# Patient Record
Sex: FEMALE | Race: WHITE | Employment: UNEMPLOYED | URBAN - METROPOLITAN AREA
[De-identification: names, ages, dates, MRNs, and addresses within clinical notes are randomized per-mention and may not be internally consistent; named-entity substitution may affect disease eponyms.]

---

## 2018-07-02 ENCOUNTER — HOSPITAL ENCOUNTER (OUTPATIENT)
Dept: WOMENS IMAGING | Age: 68
Discharge: HOME | End: 2018-07-04
Payer: MEDICARE

## 2018-07-02 DIAGNOSIS — Z12.31 VISIT FOR SCREENING MAMMOGRAM: ICD-10-CM

## 2018-07-02 DIAGNOSIS — Z01.411 ENCOUNTER FOR GYNECOLOGICAL EXAMINATION WITH ABNORMAL FINDING: ICD-10-CM

## 2018-07-02 PROCEDURE — 77063 BREAST TOMOSYNTHESIS BI: CPT

## 2019-07-17 ENCOUNTER — HOSPITAL ENCOUNTER (OUTPATIENT)
Age: 69
Setting detail: SPECIMEN
Discharge: HOME | End: 2019-07-17
Payer: MEDICARE

## 2019-07-19 LAB
HPV SAMPLE: NORMAL
HPV, GENOTYPE 16: NOT DETECTED
HPV, GENOTYPE 18: NOT DETECTED
HPV, HIGH RISK OTHER: NOT DETECTED
HPV, INTERPRETATION: NORMAL
SPECIMEN DESCRIPTION: NORMAL

## 2019-07-22 LAB — CYTOLOGY REPORT: NORMAL

## 2019-07-26 ENCOUNTER — HOSPITAL ENCOUNTER (OUTPATIENT)
Dept: WOMENS IMAGING | Age: 69
Discharge: HOME | End: 2019-07-28
Payer: MEDICARE

## 2019-07-26 ENCOUNTER — HOSPITAL ENCOUNTER (OUTPATIENT)
Age: 69
Discharge: HOME | End: 2019-07-26
Payer: MEDICARE

## 2019-07-26 DIAGNOSIS — Z12.31 VISIT FOR SCREENING MAMMOGRAM: ICD-10-CM

## 2019-07-26 LAB
ABSOLUTE EOS #: 0.2 K/UL (ref 0–0.4)
ABSOLUTE IMMATURE GRANULOCYTE: ABNORMAL K/UL (ref 0–0.3)
ABSOLUTE LYMPH #: 1.4 K/UL (ref 1–4.8)
ABSOLUTE MONO #: 0.6 K/UL (ref 0.1–1.3)
ALBUMIN SERPL-MCNC: 4.1 G/DL (ref 3.5–5.2)
ALBUMIN/GLOBULIN RATIO: ABNORMAL (ref 1–2.5)
ALP BLD-CCNC: 79 U/L (ref 35–104)
ALT SERPL-CCNC: 12 U/L (ref 5–33)
ANION GAP SERPL CALCULATED.3IONS-SCNC: 13 MMOL/L (ref 9–17)
AST SERPL-CCNC: 15 U/L
BASOPHILS # BLD: 1 % (ref 0–2)
BASOPHILS ABSOLUTE: 0.1 K/UL (ref 0–0.2)
BILIRUB SERPL-MCNC: 0.27 MG/DL (ref 0.3–1.2)
BUN BLDV-MCNC: 11 MG/DL (ref 8–23)
BUN/CREAT BLD: ABNORMAL (ref 9–20)
CALCIUM SERPL-MCNC: 9.2 MG/DL (ref 8.6–10.4)
CHLORIDE BLD-SCNC: 104 MMOL/L (ref 98–107)
CHOLESTEROL/HDL RATIO: 2.3
CHOLESTEROL: 192 MG/DL
CO2: 23 MMOL/L (ref 20–31)
CREAT SERPL-MCNC: 0.52 MG/DL (ref 0.5–0.9)
DIFFERENTIAL TYPE: ABNORMAL
EOSINOPHILS RELATIVE PERCENT: 3 % (ref 0–4)
ESTIMATED AVERAGE GLUCOSE: 137 MG/DL
GFR AFRICAN AMERICAN: >60 ML/MIN
GFR NON-AFRICAN AMERICAN: >60 ML/MIN
GFR SERPL CREATININE-BSD FRML MDRD: ABNORMAL ML/MIN/{1.73_M2}
GFR SERPL CREATININE-BSD FRML MDRD: ABNORMAL ML/MIN/{1.73_M2}
GLUCOSE BLD-MCNC: 127 MG/DL (ref 70–99)
HBA1C MFR BLD: 6.4 % (ref 4–6)
HCT VFR BLD CALC: 40.1 % (ref 36–46)
HDLC SERPL-MCNC: 83 MG/DL
HEMOGLOBIN: 12.8 G/DL (ref 12–16)
IMMATURE GRANULOCYTES: ABNORMAL %
LDL CHOLESTEROL: 99 MG/DL (ref 0–130)
LYMPHOCYTES # BLD: 22 % (ref 24–44)
MCH RBC QN AUTO: 29.6 PG (ref 26–34)
MCHC RBC AUTO-ENTMCNC: 32 G/DL (ref 31–37)
MCV RBC AUTO: 92.6 FL (ref 80–100)
MONOCYTES # BLD: 10 % (ref 1–7)
NRBC AUTOMATED: ABNORMAL PER 100 WBC
PDW BLD-RTO: 15.4 % (ref 11.5–14.9)
PLATELET # BLD: 393 K/UL (ref 150–450)
PLATELET ESTIMATE: ABNORMAL
PMV BLD AUTO: 6.8 FL (ref 6–12)
POTASSIUM SERPL-SCNC: 4.6 MMOL/L (ref 3.7–5.3)
RBC # BLD: 4.33 M/UL (ref 4–5.2)
RBC # BLD: ABNORMAL 10*6/UL
SEG NEUTROPHILS: 64 % (ref 36–66)
SEGMENTED NEUTROPHILS ABSOLUTE COUNT: 4 K/UL (ref 1.3–9.1)
SODIUM BLD-SCNC: 140 MMOL/L (ref 135–144)
THYROXINE, FREE: 1 NG/DL (ref 0.93–1.7)
TOTAL PROTEIN: 7.4 G/DL (ref 6.4–8.3)
TRIGL SERPL-MCNC: 48 MG/DL
TSH SERPL DL<=0.05 MIU/L-ACNC: 1.73 MIU/L (ref 0.3–5)
VITAMIN B-12: <150 PG/ML (ref 232–1245)
VITAMIN D 25-HYDROXY: 8 NG/ML (ref 30–100)
VLDLC SERPL CALC-MCNC: NORMAL MG/DL (ref 1–30)
WBC # BLD: 6.3 K/UL (ref 3.5–11)
WBC # BLD: ABNORMAL 10*3/UL

## 2019-07-26 PROCEDURE — 80061 LIPID PANEL: CPT

## 2019-07-26 PROCEDURE — 84439 ASSAY OF FREE THYROXINE: CPT

## 2019-07-26 PROCEDURE — 83036 HEMOGLOBIN GLYCOSYLATED A1C: CPT

## 2019-07-26 PROCEDURE — 82306 VITAMIN D 25 HYDROXY: CPT

## 2019-07-26 PROCEDURE — 36415 COLL VENOUS BLD VENIPUNCTURE: CPT

## 2019-07-26 PROCEDURE — 82607 VITAMIN B-12: CPT

## 2019-07-26 PROCEDURE — 84443 ASSAY THYROID STIM HORMONE: CPT

## 2019-07-26 PROCEDURE — 77063 BREAST TOMOSYNTHESIS BI: CPT

## 2019-07-26 PROCEDURE — 85025 COMPLETE CBC W/AUTO DIFF WBC: CPT

## 2019-07-26 PROCEDURE — 80053 COMPREHEN METABOLIC PANEL: CPT

## 2019-08-08 ENCOUNTER — HOSPITAL ENCOUNTER (OUTPATIENT)
Dept: WOMENS IMAGING | Age: 69
Discharge: HOME | End: 2019-08-10
Payer: MEDICARE

## 2019-08-08 DIAGNOSIS — Z78.0 POST-MENOPAUSAL: ICD-10-CM

## 2019-08-08 DIAGNOSIS — R92.8 ABNORMAL MAMMOGRAM: ICD-10-CM

## 2019-08-08 PROCEDURE — 77080 DXA BONE DENSITY AXIAL: CPT

## 2019-08-08 PROCEDURE — G0279 TOMOSYNTHESIS, MAMMO: HCPCS

## 2019-08-12 ENCOUNTER — TELEPHONE (OUTPATIENT)
Dept: ONCOLOGY | Age: 69
End: 2019-08-12

## 2019-08-14 ENCOUNTER — HOSPITAL ENCOUNTER (OUTPATIENT)
Dept: CT IMAGING | Age: 69
Discharge: HOME | End: 2019-08-16
Payer: MEDICARE

## 2019-08-14 DIAGNOSIS — F17.210 NICOTINE DEPENDENCE, CIGARETTES, UNCOMPLICATED: ICD-10-CM

## 2019-08-14 PROCEDURE — G0297 LDCT FOR LUNG CA SCREEN: HCPCS

## 2019-09-13 ENCOUNTER — HOSPITAL ENCOUNTER (OUTPATIENT)
Age: 69
Discharge: HOME | End: 2019-09-15
Payer: MEDICARE

## 2019-09-13 ENCOUNTER — HOSPITAL ENCOUNTER (OUTPATIENT)
Dept: GENERAL RADIOLOGY | Age: 69
Discharge: HOME | End: 2019-09-15
Payer: MEDICARE

## 2019-09-13 ENCOUNTER — HOSPITAL ENCOUNTER (OUTPATIENT)
Dept: MRI IMAGING | Age: 69
Discharge: HOME | End: 2019-09-15
Payer: MEDICARE

## 2019-09-13 DIAGNOSIS — M54.6 PAIN IN THORACIC SPINE: ICD-10-CM

## 2019-09-13 DIAGNOSIS — M54.6 THORACIC SPINE PAIN: ICD-10-CM

## 2019-09-13 PROCEDURE — 72146 MRI CHEST SPINE W/O DYE: CPT

## 2019-09-13 PROCEDURE — 72072 X-RAY EXAM THORAC SPINE 3VWS: CPT

## 2019-11-05 ENCOUNTER — OFFICE VISIT (OUTPATIENT)
Dept: ORTHOPEDIC SURGERY | Age: 69
End: 2019-11-05
Payer: MEDICARE

## 2019-11-05 VITALS — HEIGHT: 62 IN | BODY MASS INDEX: 20.61 KG/M2 | WEIGHT: 112 LBS

## 2019-11-05 DIAGNOSIS — M54.6 ACUTE MIDLINE THORACIC BACK PAIN: Primary | ICD-10-CM

## 2019-11-05 PROCEDURE — G8484 FLU IMMUNIZE NO ADMIN: HCPCS | Performed by: ORTHOPAEDIC SURGERY

## 2019-11-05 PROCEDURE — 4004F PT TOBACCO SCREEN RCVD TLK: CPT | Performed by: ORTHOPAEDIC SURGERY

## 2019-11-05 PROCEDURE — G8420 CALC BMI NORM PARAMETERS: HCPCS | Performed by: ORTHOPAEDIC SURGERY

## 2019-11-05 PROCEDURE — 1123F ACP DISCUSS/DSCN MKR DOCD: CPT | Performed by: ORTHOPAEDIC SURGERY

## 2019-11-05 PROCEDURE — 3017F COLORECTAL CA SCREEN DOC REV: CPT | Performed by: ORTHOPAEDIC SURGERY

## 2019-11-05 PROCEDURE — 1090F PRES/ABSN URINE INCON ASSESS: CPT | Performed by: ORTHOPAEDIC SURGERY

## 2019-11-05 PROCEDURE — G8427 DOCREV CUR MEDS BY ELIG CLIN: HCPCS | Performed by: ORTHOPAEDIC SURGERY

## 2019-11-05 PROCEDURE — 4040F PNEUMOC VAC/ADMIN/RCVD: CPT | Performed by: ORTHOPAEDIC SURGERY

## 2019-11-05 PROCEDURE — 99203 OFFICE O/P NEW LOW 30 MIN: CPT | Performed by: ORTHOPAEDIC SURGERY

## 2019-11-05 PROCEDURE — G8399 PT W/DXA RESULTS DOCUMENT: HCPCS | Performed by: ORTHOPAEDIC SURGERY

## 2019-11-05 RX ORDER — NAPROXEN 500 MG/1
500 TABLET ORAL 2 TIMES DAILY WITH MEALS
Qty: 60 TABLET | Refills: 0 | Status: SHIPPED | OUTPATIENT
Start: 2019-11-05 | End: 2019-12-03 | Stop reason: SDUPTHER

## 2019-11-05 ASSESSMENT — ENCOUNTER SYMPTOMS: BACK PAIN: 1

## 2019-11-20 ENCOUNTER — HOSPITAL ENCOUNTER (OUTPATIENT)
Dept: PHYSICAL THERAPY | Age: 69
Setting detail: THERAPIES SERIES
Discharge: HOME | End: 2019-11-20
Payer: MEDICARE

## 2019-11-20 PROCEDURE — 97110 THERAPEUTIC EXERCISES: CPT

## 2019-11-20 PROCEDURE — 97016 VASOPNEUMATIC DEVICE THERAPY: CPT

## 2019-11-20 PROCEDURE — 97162 PT EVAL MOD COMPLEX 30 MIN: CPT

## 2019-11-20 ASSESSMENT — PAIN DESCRIPTION - LOCATION: LOCATION: BACK

## 2019-11-20 ASSESSMENT — PAIN SCALES - GENERAL: PAINLEVEL_OUTOF10: 6

## 2019-11-20 ASSESSMENT — PAIN DESCRIPTION - PROGRESSION: CLINICAL_PROGRESSION: GRADUALLY WORSENING

## 2019-11-20 ASSESSMENT — PAIN DESCRIPTION - ORIENTATION: ORIENTATION: LOWER;MID

## 2019-11-20 ASSESSMENT — PAIN DESCRIPTION - ONSET: ONSET: ON-GOING

## 2019-11-20 ASSESSMENT — PAIN DESCRIPTION - DIRECTION: RADIATING_TOWARDS: LOWER BACK

## 2019-11-20 ASSESSMENT — PAIN DESCRIPTION - DESCRIPTORS: DESCRIPTORS: SHARP;PRESSURE

## 2019-11-20 ASSESSMENT — PAIN - FUNCTIONAL ASSESSMENT: PAIN_FUNCTIONAL_ASSESSMENT: PREVENTS OR INTERFERES SOME ACTIVE ACTIVITIES AND ADLS

## 2019-11-20 ASSESSMENT — PAIN DESCRIPTION - PAIN TYPE: TYPE: CHRONIC PAIN

## 2019-11-20 ASSESSMENT — PAIN DESCRIPTION - FREQUENCY: FREQUENCY: CONTINUOUS

## 2019-11-21 ASSESSMENT — PAIN SCALES - GENERAL: PAINLEVEL_OUTOF10: 6

## 2019-11-21 ASSESSMENT — PAIN DESCRIPTION - LOCATION: LOCATION: BACK

## 2019-11-21 ASSESSMENT — PAIN DESCRIPTION - ONSET: ONSET: ON-GOING

## 2019-11-21 ASSESSMENT — PAIN DESCRIPTION - PAIN TYPE: TYPE: CHRONIC PAIN

## 2019-11-21 ASSESSMENT — PAIN DESCRIPTION - FREQUENCY: FREQUENCY: CONTINUOUS

## 2019-11-21 ASSESSMENT — PAIN DESCRIPTION - ORIENTATION: ORIENTATION: LOWER;MID

## 2019-11-21 ASSESSMENT — PAIN DESCRIPTION - DIRECTION: RADIATING_TOWARDS: LOWER BACK

## 2019-11-21 ASSESSMENT — PAIN DESCRIPTION - DESCRIPTORS: DESCRIPTORS: SHARP

## 2019-11-21 ASSESSMENT — PAIN - FUNCTIONAL ASSESSMENT: PAIN_FUNCTIONAL_ASSESSMENT: PREVENTS OR INTERFERES SOME ACTIVE ACTIVITIES AND ADLS

## 2019-11-21 ASSESSMENT — PAIN DESCRIPTION - PROGRESSION: CLINICAL_PROGRESSION: GRADUALLY WORSENING

## 2019-11-25 ENCOUNTER — HOSPITAL ENCOUNTER (OUTPATIENT)
Dept: PHYSICAL THERAPY | Age: 69
Setting detail: THERAPIES SERIES
Discharge: HOME | End: 2019-11-25
Payer: MEDICARE

## 2019-11-25 PROCEDURE — 97110 THERAPEUTIC EXERCISES: CPT

## 2019-11-25 ASSESSMENT — PAIN DESCRIPTION - LOCATION: LOCATION: BACK

## 2019-11-25 ASSESSMENT — PAIN DESCRIPTION - PAIN TYPE: TYPE: CHRONIC PAIN

## 2019-11-25 ASSESSMENT — PAIN DESCRIPTION - DIRECTION: RADIATING_TOWARDS: LOW BACK

## 2019-11-25 ASSESSMENT — PAIN SCALES - GENERAL: PAINLEVEL_OUTOF10: 6

## 2019-11-25 ASSESSMENT — PAIN DESCRIPTION - ORIENTATION: ORIENTATION: LOWER;MID

## 2019-11-25 ASSESSMENT — PAIN DESCRIPTION - DESCRIPTORS: DESCRIPTORS: SHARP

## 2019-11-27 ENCOUNTER — HOSPITAL ENCOUNTER (OUTPATIENT)
Dept: PHYSICAL THERAPY | Age: 69
Setting detail: THERAPIES SERIES
Discharge: HOME | End: 2019-11-27
Payer: MEDICARE

## 2019-11-27 PROCEDURE — 97110 THERAPEUTIC EXERCISES: CPT

## 2019-11-27 ASSESSMENT — PAIN DESCRIPTION - ORIENTATION: ORIENTATION: POSTERIOR;MID;LOWER

## 2019-11-27 ASSESSMENT — PAIN DESCRIPTION - DESCRIPTORS: DESCRIPTORS: SHARP

## 2019-11-27 ASSESSMENT — PAIN DESCRIPTION - DIRECTION: RADIATING_TOWARDS: LOW BACK

## 2019-11-27 ASSESSMENT — PAIN SCALES - GENERAL: PAINLEVEL_OUTOF10: 6

## 2019-11-27 ASSESSMENT — PAIN DESCRIPTION - LOCATION: LOCATION: NECK;BACK

## 2019-11-27 ASSESSMENT — PAIN DESCRIPTION - PAIN TYPE: TYPE: CHRONIC PAIN

## 2019-12-02 ENCOUNTER — HOSPITAL ENCOUNTER (OUTPATIENT)
Dept: PHYSICAL THERAPY | Age: 69
Setting detail: THERAPIES SERIES
Discharge: HOME | End: 2019-12-02
Payer: MEDICARE

## 2019-12-02 PROCEDURE — 97110 THERAPEUTIC EXERCISES: CPT

## 2019-12-02 ASSESSMENT — PAIN SCALES - GENERAL: PAINLEVEL_OUTOF10: 2

## 2019-12-02 ASSESSMENT — PAIN - FUNCTIONAL ASSESSMENT: PAIN_FUNCTIONAL_ASSESSMENT: PREVENTS OR INTERFERES SOME ACTIVE ACTIVITIES AND ADLS

## 2019-12-02 ASSESSMENT — PAIN DESCRIPTION - LOCATION: LOCATION: NECK;BACK

## 2019-12-02 ASSESSMENT — PAIN DESCRIPTION - PROGRESSION: CLINICAL_PROGRESSION: GRADUALLY IMPROVING

## 2019-12-02 ASSESSMENT — PAIN DESCRIPTION - FREQUENCY: FREQUENCY: CONTINUOUS

## 2019-12-02 ASSESSMENT — PAIN DESCRIPTION - ORIENTATION: ORIENTATION: POSTERIOR;MID;LOWER

## 2019-12-02 ASSESSMENT — PAIN DESCRIPTION - PAIN TYPE: TYPE: CHRONIC PAIN

## 2019-12-02 ASSESSMENT — PAIN DESCRIPTION - ONSET: ONSET: UNABLE TO TELL

## 2019-12-02 ASSESSMENT — PAIN DESCRIPTION - DESCRIPTORS: DESCRIPTORS: SHARP;DULL

## 2019-12-02 ASSESSMENT — PAIN DESCRIPTION - DIRECTION: RADIATING_TOWARDS: ACROSS LOWER BACK

## 2019-12-03 DIAGNOSIS — M54.6 ACUTE MIDLINE THORACIC BACK PAIN: ICD-10-CM

## 2019-12-04 ENCOUNTER — HOSPITAL ENCOUNTER (OUTPATIENT)
Dept: PHYSICAL THERAPY | Age: 69
Setting detail: THERAPIES SERIES
Discharge: HOME | End: 2019-12-04
Payer: MEDICARE

## 2019-12-04 PROCEDURE — 97110 THERAPEUTIC EXERCISES: CPT

## 2019-12-04 ASSESSMENT — PAIN DESCRIPTION - PROGRESSION: CLINICAL_PROGRESSION: GRADUALLY IMPROVING

## 2019-12-04 ASSESSMENT — PAIN SCALES - GENERAL: PAINLEVEL_OUTOF10: 2

## 2019-12-04 ASSESSMENT — PAIN DESCRIPTION - DESCRIPTORS: DESCRIPTORS: DULL;SHARP

## 2019-12-04 ASSESSMENT — PAIN DESCRIPTION - ORIENTATION: ORIENTATION: POSTERIOR;MID

## 2019-12-04 ASSESSMENT — PAIN DESCRIPTION - LOCATION: LOCATION: NECK;BACK

## 2019-12-04 ASSESSMENT — PAIN DESCRIPTION - FREQUENCY: FREQUENCY: CONTINUOUS

## 2019-12-04 ASSESSMENT — PAIN - FUNCTIONAL ASSESSMENT: PAIN_FUNCTIONAL_ASSESSMENT: PREVENTS OR INTERFERES SOME ACTIVE ACTIVITIES AND ADLS

## 2019-12-04 ASSESSMENT — PAIN DESCRIPTION - ONSET: ONSET: UNABLE TO TELL

## 2019-12-04 ASSESSMENT — PAIN DESCRIPTION - PAIN TYPE: TYPE: CHRONIC PAIN

## 2019-12-07 RX ORDER — NAPROXEN 500 MG/1
TABLET ORAL
Qty: 60 TABLET | Refills: 0 | Status: SHIPPED | OUTPATIENT
Start: 2019-12-07 | End: 2020-01-09

## 2019-12-09 ENCOUNTER — HOSPITAL ENCOUNTER (OUTPATIENT)
Dept: PHYSICAL THERAPY | Age: 69
Setting detail: THERAPIES SERIES
Discharge: HOME | End: 2019-12-09
Payer: MEDICARE

## 2019-12-09 PROCEDURE — 97110 THERAPEUTIC EXERCISES: CPT

## 2019-12-09 ASSESSMENT — PAIN DESCRIPTION - PROGRESSION: CLINICAL_PROGRESSION: GRADUALLY IMPROVING

## 2019-12-10 ASSESSMENT — PAIN DESCRIPTION - FREQUENCY: FREQUENCY: CONTINUOUS

## 2019-12-10 ASSESSMENT — PAIN SCALES - GENERAL: PAINLEVEL_OUTOF10: 1

## 2019-12-10 ASSESSMENT — PAIN DESCRIPTION - LOCATION: LOCATION: NECK;BACK

## 2019-12-10 ASSESSMENT — PAIN DESCRIPTION - PAIN TYPE: TYPE: CHRONIC PAIN

## 2019-12-10 ASSESSMENT — PAIN DESCRIPTION - ORIENTATION: ORIENTATION: POSTERIOR;UPPER

## 2019-12-10 ASSESSMENT — PAIN DESCRIPTION - PROGRESSION: CLINICAL_PROGRESSION: GRADUALLY IMPROVING

## 2019-12-10 ASSESSMENT — PAIN - FUNCTIONAL ASSESSMENT: PAIN_FUNCTIONAL_ASSESSMENT: ACTIVITIES ARE NOT PREVENTED

## 2019-12-10 ASSESSMENT — PAIN DESCRIPTION - DESCRIPTORS: DESCRIPTORS: DULL;DISCOMFORT

## 2019-12-10 ASSESSMENT — PAIN DESCRIPTION - ONSET: ONSET: GRADUAL

## 2019-12-11 ENCOUNTER — HOSPITAL ENCOUNTER (OUTPATIENT)
Dept: PHYSICAL THERAPY | Age: 69
Setting detail: THERAPIES SERIES
Discharge: HOME | End: 2019-12-11
Payer: MEDICARE

## 2019-12-11 PROCEDURE — 97140 MANUAL THERAPY 1/> REGIONS: CPT

## 2019-12-11 PROCEDURE — 97110 THERAPEUTIC EXERCISES: CPT

## 2019-12-11 ASSESSMENT — PAIN DESCRIPTION - ONSET: ONSET: ON-GOING

## 2019-12-11 ASSESSMENT — PAIN DESCRIPTION - PAIN TYPE: TYPE: CHRONIC PAIN

## 2019-12-11 ASSESSMENT — PAIN - FUNCTIONAL ASSESSMENT: PAIN_FUNCTIONAL_ASSESSMENT: PREVENTS OR INTERFERES SOME ACTIVE ACTIVITIES AND ADLS

## 2019-12-11 ASSESSMENT — PAIN DESCRIPTION - PROGRESSION: CLINICAL_PROGRESSION: GRADUALLY IMPROVING

## 2019-12-11 ASSESSMENT — PAIN SCALES - GENERAL: PAINLEVEL_OUTOF10: 3

## 2019-12-11 ASSESSMENT — PAIN DESCRIPTION - DESCRIPTORS: DESCRIPTORS: ACHING;CONSTANT

## 2019-12-11 ASSESSMENT — PAIN DESCRIPTION - FREQUENCY: FREQUENCY: CONTINUOUS

## 2019-12-11 ASSESSMENT — PAIN DESCRIPTION - LOCATION: LOCATION: BACK;NECK

## 2019-12-19 ENCOUNTER — HOSPITAL ENCOUNTER (OUTPATIENT)
Dept: PHYSICAL THERAPY | Age: 69
Setting detail: THERAPIES SERIES
Discharge: HOME | End: 2019-12-19
Payer: MEDICARE

## 2019-12-19 PROCEDURE — 97112 NEUROMUSCULAR REEDUCATION: CPT

## 2019-12-19 PROCEDURE — 97110 THERAPEUTIC EXERCISES: CPT

## 2019-12-19 ASSESSMENT — PAIN DESCRIPTION - PROGRESSION: CLINICAL_PROGRESSION: GRADUALLY IMPROVING

## 2019-12-23 ENCOUNTER — HOSPITAL ENCOUNTER (OUTPATIENT)
Dept: PHYSICAL THERAPY | Age: 69
Setting detail: THERAPIES SERIES
Discharge: HOME | End: 2019-12-23
Payer: MEDICARE

## 2019-12-23 PROCEDURE — 97110 THERAPEUTIC EXERCISES: CPT

## 2019-12-23 PROCEDURE — 97112 NEUROMUSCULAR REEDUCATION: CPT

## 2019-12-23 ASSESSMENT — PAIN DESCRIPTION - DESCRIPTORS: DESCRIPTORS: ACHING;DISCOMFORT

## 2019-12-23 ASSESSMENT — PAIN DESCRIPTION - PROGRESSION: CLINICAL_PROGRESSION: GRADUALLY IMPROVING

## 2019-12-23 ASSESSMENT — PAIN DESCRIPTION - FREQUENCY: FREQUENCY: CONTINUOUS

## 2019-12-23 ASSESSMENT — PAIN SCALES - GENERAL: PAINLEVEL_OUTOF10: 3

## 2019-12-23 ASSESSMENT — PAIN DESCRIPTION - ONSET: ONSET: ON-GOING

## 2019-12-23 ASSESSMENT — PAIN DESCRIPTION - LOCATION: LOCATION: BACK;NECK

## 2019-12-23 ASSESSMENT — PAIN - FUNCTIONAL ASSESSMENT: PAIN_FUNCTIONAL_ASSESSMENT: PREVENTS OR INTERFERES SOME ACTIVE ACTIVITIES AND ADLS

## 2019-12-23 ASSESSMENT — PAIN DESCRIPTION - PAIN TYPE: TYPE: CHRONIC PAIN

## 2019-12-26 ENCOUNTER — HOSPITAL ENCOUNTER (OUTPATIENT)
Dept: PHYSICAL THERAPY | Age: 69
Setting detail: THERAPIES SERIES
Discharge: HOME | End: 2019-12-26
Payer: MEDICARE

## 2019-12-26 PROCEDURE — 97110 THERAPEUTIC EXERCISES: CPT

## 2019-12-26 PROCEDURE — 97112 NEUROMUSCULAR REEDUCATION: CPT

## 2019-12-26 ASSESSMENT — PAIN DESCRIPTION - FREQUENCY: FREQUENCY: CONTINUOUS

## 2019-12-26 ASSESSMENT — PAIN DESCRIPTION - ONSET: ONSET: ON-GOING

## 2019-12-26 ASSESSMENT — PAIN DESCRIPTION - PAIN TYPE: TYPE: CHRONIC PAIN

## 2019-12-26 ASSESSMENT — PAIN DESCRIPTION - LOCATION: LOCATION: BACK;NECK

## 2019-12-26 ASSESSMENT — PAIN DESCRIPTION - PROGRESSION: CLINICAL_PROGRESSION: GRADUALLY IMPROVING

## 2019-12-26 ASSESSMENT — PAIN DESCRIPTION - ORIENTATION: ORIENTATION: MID;PROXIMAL

## 2019-12-26 ASSESSMENT — PAIN SCALES - GENERAL: PAINLEVEL_OUTOF10: 3

## 2019-12-26 ASSESSMENT — PAIN - FUNCTIONAL ASSESSMENT: PAIN_FUNCTIONAL_ASSESSMENT: PREVENTS OR INTERFERES SOME ACTIVE ACTIVITIES AND ADLS

## 2019-12-26 ASSESSMENT — PAIN DESCRIPTION - DESCRIPTORS: DESCRIPTORS: ACHING

## 2019-12-30 ENCOUNTER — HOSPITAL ENCOUNTER (OUTPATIENT)
Dept: PHYSICAL THERAPY | Age: 69
Setting detail: THERAPIES SERIES
Discharge: HOME | End: 2019-12-30
Payer: MEDICARE

## 2019-12-30 PROCEDURE — 97110 THERAPEUTIC EXERCISES: CPT

## 2019-12-30 PROCEDURE — 97112 NEUROMUSCULAR REEDUCATION: CPT

## 2019-12-30 ASSESSMENT — PAIN DESCRIPTION - ONSET: ONSET: ON-GOING

## 2019-12-30 ASSESSMENT — PAIN DESCRIPTION - LOCATION: LOCATION: BACK;NECK

## 2019-12-30 ASSESSMENT — PAIN DESCRIPTION - PROGRESSION: CLINICAL_PROGRESSION: GRADUALLY IMPROVING

## 2019-12-30 ASSESSMENT — PAIN SCALES - GENERAL: PAINLEVEL_OUTOF10: 2

## 2019-12-30 ASSESSMENT — PAIN DESCRIPTION - DESCRIPTORS: DESCRIPTORS: ACHING

## 2019-12-30 ASSESSMENT — PAIN - FUNCTIONAL ASSESSMENT: PAIN_FUNCTIONAL_ASSESSMENT: PREVENTS OR INTERFERES SOME ACTIVE ACTIVITIES AND ADLS

## 2019-12-30 ASSESSMENT — PAIN DESCRIPTION - FREQUENCY: FREQUENCY: CONTINUOUS

## 2019-12-30 ASSESSMENT — PAIN DESCRIPTION - ORIENTATION: ORIENTATION: MID;PROXIMAL

## 2019-12-30 ASSESSMENT — PAIN DESCRIPTION - PAIN TYPE: TYPE: CHRONIC PAIN

## 2020-01-02 ENCOUNTER — HOSPITAL ENCOUNTER (OUTPATIENT)
Dept: PHYSICAL THERAPY | Age: 70
Setting detail: THERAPIES SERIES
Discharge: HOME | End: 2020-01-02
Payer: MEDICARE

## 2020-01-02 ENCOUNTER — OFFICE VISIT (OUTPATIENT)
Dept: ORTHOPEDIC SURGERY | Age: 70
End: 2020-01-02
Payer: MEDICARE

## 2020-01-02 PROCEDURE — G8427 DOCREV CUR MEDS BY ELIG CLIN: HCPCS | Performed by: ORTHOPAEDIC SURGERY

## 2020-01-02 PROCEDURE — 4004F PT TOBACCO SCREEN RCVD TLK: CPT | Performed by: ORTHOPAEDIC SURGERY

## 2020-01-02 PROCEDURE — 97112 NEUROMUSCULAR REEDUCATION: CPT

## 2020-01-02 PROCEDURE — 1090F PRES/ABSN URINE INCON ASSESS: CPT | Performed by: ORTHOPAEDIC SURGERY

## 2020-01-02 PROCEDURE — 99213 OFFICE O/P EST LOW 20 MIN: CPT | Performed by: ORTHOPAEDIC SURGERY

## 2020-01-02 PROCEDURE — 3017F COLORECTAL CA SCREEN DOC REV: CPT | Performed by: ORTHOPAEDIC SURGERY

## 2020-01-02 PROCEDURE — 4040F PNEUMOC VAC/ADMIN/RCVD: CPT | Performed by: ORTHOPAEDIC SURGERY

## 2020-01-02 PROCEDURE — 1123F ACP DISCUSS/DSCN MKR DOCD: CPT | Performed by: ORTHOPAEDIC SURGERY

## 2020-01-02 PROCEDURE — G8420 CALC BMI NORM PARAMETERS: HCPCS | Performed by: ORTHOPAEDIC SURGERY

## 2020-01-02 PROCEDURE — G8484 FLU IMMUNIZE NO ADMIN: HCPCS | Performed by: ORTHOPAEDIC SURGERY

## 2020-01-02 PROCEDURE — G8399 PT W/DXA RESULTS DOCUMENT: HCPCS | Performed by: ORTHOPAEDIC SURGERY

## 2020-01-02 PROCEDURE — 97110 THERAPEUTIC EXERCISES: CPT

## 2020-01-02 ASSESSMENT — PAIN DESCRIPTION - PROGRESSION: CLINICAL_PROGRESSION: GRADUALLY IMPROVING

## 2020-01-02 ASSESSMENT — PAIN DESCRIPTION - FREQUENCY: FREQUENCY: CONTINUOUS

## 2020-01-02 ASSESSMENT — PAIN DESCRIPTION - DESCRIPTORS: DESCRIPTORS: ACHING

## 2020-01-02 ASSESSMENT — PAIN SCALES - GENERAL: PAINLEVEL_OUTOF10: 6

## 2020-01-02 ASSESSMENT — PAIN DESCRIPTION - PAIN TYPE: TYPE: CHRONIC PAIN

## 2020-01-02 ASSESSMENT — PAIN DESCRIPTION - LOCATION: LOCATION: BACK;NECK

## 2020-01-02 ASSESSMENT — PAIN DESCRIPTION - ONSET: ONSET: ON-GOING

## 2020-01-02 ASSESSMENT — PAIN - FUNCTIONAL ASSESSMENT: PAIN_FUNCTIONAL_ASSESSMENT: PREVENTS OR INTERFERES SOME ACTIVE ACTIVITIES AND ADLS

## 2020-01-02 ASSESSMENT — PAIN DESCRIPTION - ORIENTATION: ORIENTATION: MID;PROXIMAL

## 2020-01-02 NOTE — PROGRESS NOTES
resource strain: Not on file    Food insecurity:     Worry: Not on file     Inability: Not on file    Transportation needs:     Medical: Not on file     Non-medical: Not on file   Tobacco Use    Smoking status: Current Every Day Smoker     Packs/day: 1.00     Years: 40.00     Pack years: 40.00    Tobacco comment: H/O 4 packs per day, down to 1 pack per day as of 3/11/2015   Substance and Sexual Activity    Alcohol use: Not on file    Drug use: No    Sexual activity: Not on file   Lifestyle    Physical activity:     Days per week: Not on file     Minutes per session: Not on file    Stress: Not on file   Relationships    Social connections:     Talks on phone: Not on file     Gets together: Not on file     Attends Muslim service: Not on file     Active member of club or organization: Not on file     Attends meetings of clubs or organizations: Not on file     Relationship status: Not on file    Intimate partner violence:     Fear of current or ex partner: Not on file     Emotionally abused: Not on file     Physically abused: Not on file     Forced sexual activity: Not on file   Other Topics Concern    Not on file   Social History Narrative    Not on file     Past Medical History:   Diagnosis Date    Breast mass, right     H/O abnormal Pap smear     had LEEP    Lesion of skin of face     forehead    MVP (mitral valve prolapse)     Psoriasis      Past Surgical History:   Procedure Laterality Date    BREAST SURGERY Right 3-25-15    BIOPSY W/ NEEDLE LOCALIZATION.  LEEP      SKIN BIOPSY Right 3-25-15    FOREHEAD SKIN LESION    TONSILLECTOMY       No family history on file. Labs and Imaging:     XR taken today:  Xr Lumbar Spine (2-3 Views)    Result Date: 1/2/2020  AP and lateral lumbar spine some athero-sclerotic disease lumbar spine less degenerative changes than expected for patient of her age no acute fractures      Assessment and Plan:  1.  Acute low back pain, unspecified back pain laterality, unspecified whether sciatica present        This is a 77 y.o. female who presents to the clinic today for follow up of thoracic back pain. Her thoracic pain has improved, she is now complaining of pain more in her lower back. She did well with therapy on her thoracic spine and recommend that she complete therapy for her low back as her imaging is relatively unremarkable. Advised for her to follow up in 4 weeks. Scribe Attestation:  By signing my name below, Rolf Betancourt, attest that this documentation has been prepared under the direction and in the presence of Dr. Roya Tang. Electronically signed: Margo Hill, 1/2/20     Please note that this chart was generated using voice recognition Dragon dictation software. Although every effort was made to ensure the accuracy of this automated transcription, some errors in transcription may have occurred.

## 2020-01-09 RX ORDER — NAPROXEN 500 MG/1
TABLET ORAL
Qty: 60 TABLET | Refills: 0 | Status: SHIPPED | OUTPATIENT
Start: 2020-01-09 | End: 2020-02-06

## 2020-01-13 ENCOUNTER — HOSPITAL ENCOUNTER (OUTPATIENT)
Dept: PHYSICAL THERAPY | Age: 70
Setting detail: THERAPIES SERIES
Discharge: HOME | End: 2020-01-13
Payer: MEDICARE

## 2020-01-13 ENCOUNTER — APPOINTMENT (OUTPATIENT)
Dept: PHYSICAL THERAPY | Age: 70
End: 2020-01-13
Payer: MEDICARE

## 2020-01-13 PROCEDURE — 97112 NEUROMUSCULAR REEDUCATION: CPT

## 2020-01-13 PROCEDURE — 97110 THERAPEUTIC EXERCISES: CPT

## 2020-01-13 ASSESSMENT — PAIN DESCRIPTION - DESCRIPTORS
DESCRIPTORS: ACHING
DESCRIPTORS: ACHING

## 2020-01-13 ASSESSMENT — PAIN DESCRIPTION - FREQUENCY
FREQUENCY: CONTINUOUS
FREQUENCY: CONTINUOUS

## 2020-01-13 ASSESSMENT — PAIN - FUNCTIONAL ASSESSMENT: PAIN_FUNCTIONAL_ASSESSMENT: PREVENTS OR INTERFERES SOME ACTIVE ACTIVITIES AND ADLS

## 2020-01-13 ASSESSMENT — PAIN DESCRIPTION - LOCATION
LOCATION: BACK;NECK
LOCATION: BACK;NECK

## 2020-01-13 ASSESSMENT — PAIN SCALES - GENERAL
PAINLEVEL_OUTOF10: 6
PAINLEVEL_OUTOF10: 6

## 2020-01-13 ASSESSMENT — PAIN DESCRIPTION - ORIENTATION
ORIENTATION: MID;PROXIMAL
ORIENTATION: MID;PROXIMAL

## 2020-01-13 ASSESSMENT — PAIN DESCRIPTION - PROGRESSION
CLINICAL_PROGRESSION: GRADUALLY IMPROVING
CLINICAL_PROGRESSION: GRADUALLY IMPROVING

## 2020-01-13 ASSESSMENT — PAIN DESCRIPTION - ONSET
ONSET: ON-GOING
ONSET: ON-GOING

## 2020-01-13 ASSESSMENT — PAIN DESCRIPTION - PAIN TYPE
TYPE: CHRONIC PAIN
TYPE: CHRONIC PAIN

## 2020-01-14 NOTE — PROGRESS NOTES
cane  Assistance: Independent  Comment: Limited ability to assent and descend steps due to mid back pain      Balance  Posture: Poor  Sitting - Static: Good  Sitting - Dynamic: Good  Standing - Static: Good  Standing - Dynamic: Fair  Single Leg Stance R Le  Single Leg Stance L Le  AROM RUE (degrees)  RUE AROM : WFL  AROM LUE (degrees)  LUE AROM : WFL  Spine  Cervical: 50 % limited AROM in all directions   Thoracic: painful and limited extension   Lumbar: Not tested   Strength RUE  Strength RUE: WFL  Comment: mildly painful mid back   Strength LUE  Strength LUE: WFL  Comment: mildly painful mid back     Exercises  Exercise 1: HEP - postural changes -in Sitting with \"V\" pillow to lesson kyphosis and position head to neutral posture   Exercise 2: HEP - Sitting (B) UE - ER sitting in chair with \"V\" pillow 15 x   Exercise 3: HEP Avoid poor head posture - down and rotated right. Exercise 4: HEP Neck extension with towel 2 reps 25%  Exercise 5: sitting chin tucks 15 x 2   Exercise 6: sitting neck extensions to 25 % of normal with towel 30' x 2   Exercise 7: Rotations R/L with overpressure 30 '' x 2   Exercise 8: Side bending R/L 30 '' x 2 each side with over presser  Exercise 9: Sitting chin tuck 15 x 1   Exercise 10: Doorway strech 6 x 30 sec hold   Exercise 11: LEONORA   Exercise 12: prone laying   Exercise 13: LEONORA 2 min   Exercise 14: Prone press ups 15x      Modalities  Moist heat: 10 min cervical spine sitting in chair with pillow behind her back. Ortho Screen  Posture: poor       Assessment:   Conditions Requiring Skilled Therapeutic Intervention  Body structures, Functions, Activity limitations: Decreased functional mobility ; Decreased ADL status; Decreased ROM; Decreased strength;Decreased posture; Increased pain;Decreased balance;Decreased high-level IADLs  Assessment: New orders or mid and lower back pain - Dr Samantha Hernández - continue 2 x 6 weeks   Treatment Diagnosis: Mid back pain   Prognosis: Good  Barriers to Learning: Pain   REQUIRES PT FOLLOW UP: Yes  Treatment Initiated : HEP   Discharge Recommendations: Home independently  Activity Tolerance  Activity Tolerance: Patient Tolerated treatment well;Patient limited by pain      Goals:  Short term goals  Time Frame for Short term goals: 2 weeks   Short term goal 1: OPTIMAL score of 12/3 at the time of her evaluation, her goal is 9/3. (MET )  Long term goals  Time Frame for Long term goals : 6 weeks   Long term goal 1: OPTIMAL score of 6/3 at the time of her discharge. (NOT MET )  Long term goal 2:  Independent with her home program. (MET)  Patient Goals   Patient goals : Walk and move around without pain (MET)    Plan:     Continue  Timed Code Treatment Minutes: 45 Minutes  Total Treatment Time: 45  Frequency and duration of TX  Days: 2  Weeks: 6     Therapy Time   Individual Concurrent Group Co-treatment   Time In  1500         Time Out  1600         Minutes  60 min Total  15 min   N/C HP  30 min   XRD  15 min   NRD         Timed Code Treatment Minutes: 70 Meaghan Bledsoe, PT

## 2020-01-15 ENCOUNTER — HOSPITAL ENCOUNTER (OUTPATIENT)
Dept: PHYSICAL THERAPY | Age: 70
Setting detail: THERAPIES SERIES
Discharge: HOME | End: 2020-01-15
Payer: MEDICARE

## 2020-01-20 ENCOUNTER — HOSPITAL ENCOUNTER (OUTPATIENT)
Dept: PHYSICAL THERAPY | Age: 70
Setting detail: THERAPIES SERIES
Discharge: HOME | End: 2020-01-20
Payer: MEDICARE

## 2020-01-20 PROCEDURE — 97110 THERAPEUTIC EXERCISES: CPT

## 2020-01-20 ASSESSMENT — PAIN DESCRIPTION - ONSET: ONSET: ON-GOING

## 2020-01-20 ASSESSMENT — PAIN SCALES - GENERAL: PAINLEVEL_OUTOF10: 2

## 2020-01-20 ASSESSMENT — PAIN DESCRIPTION - PAIN TYPE: TYPE: CHRONIC PAIN

## 2020-01-20 ASSESSMENT — PAIN DESCRIPTION - PROGRESSION: CLINICAL_PROGRESSION: GRADUALLY IMPROVING

## 2020-01-20 ASSESSMENT — PAIN DESCRIPTION - DIRECTION: RADIATING_TOWARDS: ACROSS THE LOWER BACK

## 2020-01-20 ASSESSMENT — PAIN DESCRIPTION - FREQUENCY: FREQUENCY: CONTINUOUS

## 2020-01-20 ASSESSMENT — PAIN DESCRIPTION - ORIENTATION: ORIENTATION: MID;PROXIMAL

## 2020-01-20 ASSESSMENT — PAIN - FUNCTIONAL ASSESSMENT: PAIN_FUNCTIONAL_ASSESSMENT: PREVENTS OR INTERFERES SOME ACTIVE ACTIVITIES AND ADLS

## 2020-01-20 ASSESSMENT — PAIN DESCRIPTION - DESCRIPTORS: DESCRIPTORS: ACHING

## 2020-01-20 ASSESSMENT — PAIN DESCRIPTION - LOCATION: LOCATION: BACK;NECK

## 2020-01-20 NOTE — PROGRESS NOTES
Physical Therapy  Daily Treatment Note  Date: 2020  Patient Name: Simone Smiley  MRN: [id number]     :   1950    Subjective:   General  Referring Practitioner: Stella Foss   PT Visit Information  Onset Date: 11/15/18  PT Insurance Information: [place] [employer]  Total # of Visits Approved: 24  Total # of Visits to Date: 14  Plan of Care/Certification Expiration Date: 20  No Show: 0  Canceled Appointment: 1  Progress Note Counter:   Subjective  Subjective: I feel pretty good, with less pain and I feel looser. Pain Screening  Patient Currently in Pain: Yes  Pain Assessment  Pain Assessment: 0-10  Pain Level: 2  Patient's Stated Pain Goal: No pain  Pain Type: Chronic pain  Pain Location: Back;Neck  Pain Orientation: Mid;Proximal  Pain Radiating Towards: Across the lower back   Pain Descriptors: Aching  Pain Frequency: Continuous  Pain Onset: On-going  Clinical Progression: Gradually improving  Functional Pain Assessment: Prevents or interferes some active activities and ADLs  Non-Pharmaceutical Pain Intervention(s): Ambulation/Increased Activity; Emotional support; Environmental changes; Heat applied;Repositioned;Relaxation techniques; Rest  Response to Pain Intervention: None  Multiple Pain Sites: Yes  Vital Signs  Patient Currently in Pain: Yes  Patient Observation  Observations: Limited mobility due to mid back pain. Treatment Activities:      Bed mobility  Bridging: Independent  Transfers  Sit to Stand: Independent        Ambulation  Ambulation?: Yes  Ambulation 1  Surface: uneven;carpet;outdoors;level tile  Device: Single point cane; No Device  Assistance: Independent  Quality of Gait: Fair   Gait Deviations: Slow Parul;Deviated path  Distance: 250 ft   Comments: limited ability to walk due to mid back pain   Stairs/Curb  Stairs?: Yes  Stairs  # Steps : 5  Stairs Height: 6\"  Rails: Bilateral  Device: No Device; Single pt cane  Assistance: Independent  Comment: Limited ability to assent and

## 2020-01-22 ENCOUNTER — HOSPITAL ENCOUNTER (OUTPATIENT)
Dept: PHYSICAL THERAPY | Age: 70
Setting detail: THERAPIES SERIES
Discharge: HOME | End: 2020-01-22
Payer: MEDICARE

## 2020-01-22 PROCEDURE — 97110 THERAPEUTIC EXERCISES: CPT

## 2020-01-22 ASSESSMENT — PAIN DESCRIPTION - PROGRESSION: CLINICAL_PROGRESSION: GRADUALLY IMPROVING

## 2020-01-22 ASSESSMENT — PAIN DESCRIPTION - FREQUENCY: FREQUENCY: CONTINUOUS

## 2020-01-22 ASSESSMENT — PAIN SCALES - GENERAL: PAINLEVEL_OUTOF10: 2

## 2020-01-22 ASSESSMENT — PAIN DESCRIPTION - DESCRIPTORS: DESCRIPTORS: ACHING

## 2020-01-22 ASSESSMENT — PAIN - FUNCTIONAL ASSESSMENT: PAIN_FUNCTIONAL_ASSESSMENT: PREVENTS OR INTERFERES SOME ACTIVE ACTIVITIES AND ADLS

## 2020-01-22 ASSESSMENT — PAIN DESCRIPTION - ORIENTATION: ORIENTATION: MID;PROXIMAL

## 2020-01-22 ASSESSMENT — PAIN DESCRIPTION - PAIN TYPE: TYPE: CHRONIC PAIN

## 2020-01-22 ASSESSMENT — PAIN DESCRIPTION - ONSET: ONSET: ON-GOING

## 2020-01-22 ASSESSMENT — PAIN DESCRIPTION - LOCATION: LOCATION: BACK;NECK

## 2020-01-22 NOTE — PROGRESS NOTES
steps due to mid back pain      Balance  Posture: Poor  Sitting - Static: Good  Sitting - Dynamic: Good  Standing - Static: Good  Standing - Dynamic: Fair  Single Leg Stance R Le  Single Leg Stance L Le  AROM RUE (degrees)  RUE AROM : WFL  AROM LUE (degrees)  LUE AROM : WFL  Spine  Cervical: 50 % limited AROM in all directions   Thoracic: painful and limited extension   Lumbar: Not tested   Strength RUE  Strength RUE: WFL  Comment: mildly painful mid back   Strength LUE  Strength LUE: WFL  Comment: mildly painful mid back     Exercises  Exercise 1: HEP - postural changes -in Sitting with \"V\" pillow to lesson kyphosis and position head to neutral posture   Exercise 2: HEP - Sitting (B) UE - ER sitting in chair with \"V\" pillow 15 x   Exercise 3: HEP Avoid poor head posture - down and rotated right. Exercise 4: HEP Neck extension with towel 2 reps 25%  Exercise 5: Sitting chin tucks 15 x 2   Exercise 6: Sitting neck extensions to 25 % of normal with towel 30' x 2   Exercise 7: Rotations R/L with overpressure 30 '' x 2   Exercise 8: Side bending R/L 30 '' x 2 each side with over presser  Exercise 9: Sitting chin tuck 15 x 1   Exercise 10:  Doorway strech 6 x 30 sec hold   Exercise 11: T band rows/pull downs (red) 15 x 1   Exercise 10: T band ER(B) UE (red) 15 x 1   Exercise 11: PREP - lower back prone laying 1-2 min -- LEONORA 1-2 min -- prone press ups 10 x 2 -- prone laying 1-2 min   Exercise 12: Prone laying 2 min   Exercise 13: LEONORA 2 min   Exercise 14: Prone press ups 10 x 2       Ortho Screen  Posture: poor       Assessment:   Conditions Requiring Skilled Therapeutic Intervention  Body structures, Functions, Activity limitations: Decreased functional mobility ; Decreased ADL status; Decreased ROM; Decreased strength;Decreased posture; Increased pain;Decreased balance;Decreased high-level IADLs  Assessment: Making good progress with her neck program and her lower back complaints are much less.    Treatment Diagnosis: Mid back pain   Prognosis: Good  Barriers to Learning: Pain   REQUIRES PT FOLLOW UP: Yes  Treatment Initiated : HEP   Discharge Recommendations: Home independently  Activity Tolerance  Activity Tolerance: Patient Tolerated treatment well;Patient limited by pain           Goals:  Short term goals  Time Frame for Short term goals: 2 weeks   Short term goal 1: OPTIMAL score of 12/3 at the time of her evaluation, her goal is 9/3. (MET )  Long term goals  Time Frame for Long term goals : 6 weeks   Long term goal 1: OPTIMAL score of 6/3 at the time of her discharge. (NOT MET )  Long term goal 2:  Independent with her home program. (MET)  Patient Goals   Patient goals : Walk and move around without pain (MET)    Plan:     Continue   Timed Code Treatment Minutes: 30 Minutes  Total Treatment Time: 30  Frequency and duration of TX  Days: 2  Weeks: 6     Therapy Time   Individual Concurrent Group Co-treatment   Time In  1200         Time Out  1230         Minutes  30 min   XRD         Timed Code Treatment Minutes: [place] [address] Edgardo Jeison, PT

## 2020-01-27 ENCOUNTER — HOSPITAL ENCOUNTER (OUTPATIENT)
Dept: PHYSICAL THERAPY | Age: 70
Setting detail: THERAPIES SERIES
Discharge: HOME | End: 2020-01-27
Payer: MEDICARE

## 2020-01-27 PROCEDURE — 97112 NEUROMUSCULAR REEDUCATION: CPT

## 2020-01-27 PROCEDURE — 97110 THERAPEUTIC EXERCISES: CPT

## 2020-01-27 ASSESSMENT — PAIN DESCRIPTION - PROGRESSION: CLINICAL_PROGRESSION: GRADUALLY IMPROVING

## 2020-01-27 ASSESSMENT — PAIN DESCRIPTION - ONSET: ONSET: ON-GOING

## 2020-01-27 ASSESSMENT — PAIN DESCRIPTION - PAIN TYPE: TYPE: CHRONIC PAIN

## 2020-01-27 ASSESSMENT — PAIN DESCRIPTION - LOCATION: LOCATION: BACK

## 2020-01-27 ASSESSMENT — PAIN DESCRIPTION - ORIENTATION: ORIENTATION: MID;LOWER

## 2020-01-27 ASSESSMENT — PAIN DESCRIPTION - DESCRIPTORS: DESCRIPTORS: ACHING

## 2020-01-27 ASSESSMENT — PAIN DESCRIPTION - DIRECTION: RADIATING_TOWARDS: ACROSS LOWER BACK

## 2020-01-27 ASSESSMENT — PAIN - FUNCTIONAL ASSESSMENT: PAIN_FUNCTIONAL_ASSESSMENT: PREVENTS OR INTERFERES SOME ACTIVE ACTIVITIES AND ADLS

## 2020-01-27 ASSESSMENT — PAIN SCALES - GENERAL: PAINLEVEL_OUTOF10: 6

## 2020-01-27 ASSESSMENT — PAIN DESCRIPTION - FREQUENCY: FREQUENCY: CONTINUOUS

## 2020-01-28 NOTE — PROGRESS NOTES
Poor  Sitting - Static: Good  Sitting - Dynamic: Good  Standing - Static: Good  Standing - Dynamic: Fair  Single Leg Stance R Le  Single Leg Stance L Le  AROM RUE (degrees)  RUE AROM : WFL  AROM LUE (degrees)  LUE AROM : WFL  Spine  Cervical: 50 % limited AROM in all directions   Thoracic: painful and limited extension   Lumbar: Not tested   Strength RUE  Strength RUE: WFL  Comment: mildly painful mid back   Strength LUE  Strength LUE: WFL  Comment: mildly painful mid back     Exercises  Exercise 1: HEP - postural changes -in Sitting with \"V\" pillow to lesson kyphosis and position head to neutral posture   Exercise 2: HEP - Sitting (B) UE - ER sitting in chair with \"V\" pillow 15 x   Exercise 3: HEP Avoid poor head posture - down and rotated right. Exercise 4: HEP Neck extension with towel 2 reps 25%  Exercise 5: Sitting chin tucks 15 x 2   Exercise 6: Sitting neck extensions to 25 % of normal with towel 30' x 2   Exercise 7: Rotations R/L with overpressure 30 '' x 2   Exercise 8: Side bending R/L 30 '' x 2 each side with over presser  Exercise 9: Sitting chin tuck 15 x 1   Exercise 10: Doorway strech 6 x 30 sec hold   Exercise 11: PREP - lower back prone laying 1-2 min -- LEONORA 1-2 min -- prone press ups 10 x 2 -- prone laying 1-2 min   Exercise 12: Prone laying 2 min   Exercise 13: LEONORA 2 min   Exercise 14: Prone press ups 10 x 2       Ortho Screen  Posture: poor       Assessment:   Conditions Requiring Skilled Therapeutic Intervention  Body structures, Functions, Activity limitations: Decreased functional mobility ; Decreased ADL status; Decreased ROM; Decreased strength;Decreased posture; Increased pain;Decreased balance;Decreased high-level IADLs  Assessment: Making good progress with her neck program and her lower back complaints are much less.    Treatment Diagnosis: Mid back pain   Prognosis: Good  Barriers to Learning: Pain   REQUIRES PT FOLLOW UP: Yes  Treatment Initiated : HEP   Discharge

## 2020-01-29 ENCOUNTER — HOSPITAL ENCOUNTER (OUTPATIENT)
Dept: PHYSICAL THERAPY | Age: 70
Setting detail: THERAPIES SERIES
Discharge: HOME | End: 2020-01-29
Payer: MEDICARE

## 2020-01-29 PROCEDURE — 97110 THERAPEUTIC EXERCISES: CPT

## 2020-01-29 PROCEDURE — 97112 NEUROMUSCULAR REEDUCATION: CPT

## 2020-01-29 ASSESSMENT — PAIN DESCRIPTION - PROGRESSION: CLINICAL_PROGRESSION: GRADUALLY IMPROVING

## 2020-01-29 ASSESSMENT — PAIN - FUNCTIONAL ASSESSMENT: PAIN_FUNCTIONAL_ASSESSMENT: PREVENTS OR INTERFERES SOME ACTIVE ACTIVITIES AND ADLS

## 2020-01-29 ASSESSMENT — PAIN DESCRIPTION - DESCRIPTORS: DESCRIPTORS: SHARP

## 2020-01-29 ASSESSMENT — PAIN DESCRIPTION - LOCATION: LOCATION: BACK

## 2020-01-29 ASSESSMENT — PAIN DESCRIPTION - DIRECTION: RADIATING_TOWARDS: ACROSS LOWER BACK

## 2020-01-29 ASSESSMENT — PAIN DESCRIPTION - PAIN TYPE: TYPE: CHRONIC PAIN

## 2020-01-29 ASSESSMENT — PAIN DESCRIPTION - FREQUENCY: FREQUENCY: CONTINUOUS

## 2020-01-29 ASSESSMENT — PAIN DESCRIPTION - ONSET: ONSET: ON-GOING

## 2020-01-29 ASSESSMENT — PAIN DESCRIPTION - ORIENTATION: ORIENTATION: MID;LOWER

## 2020-01-29 ASSESSMENT — PAIN SCALES - GENERAL: PAINLEVEL_OUTOF10: 6

## 2020-01-29 NOTE — PROGRESS NOTES
Physical Therapy  Daily Treatment Note  Date: 2020  Patient Name: Franko Rosen  MRN: [id number]     :   1950    Subjective:   General  Referring Practitioner: Anamika Truong   PT Visit Information  Onset Date: 11/15/18  PT Insurance Information: [place] [employer]  Total # of Visits Approved: 24  Total # of Visits to Date: 16  Plan of Care/Certification Expiration Date: 20  No Show: 0  Canceled Appointment: 1  Progress Note Counter:   Subjective  Subjective: \"Sometimes my back feels like it needs to crack. \"  The patient reports using a twisting type movement to change the pain she has in her lower back. She also reported doing the press-ups 1-2 times a day in addition to the neck exercises. Pain Screening  Patient Currently in Pain: Yes  Pain Assessment  Pain Assessment: 0-10  Pain Level: 6  Patient's Stated Pain Goal: No pain  Pain Type: Chronic pain  Pain Location: Back  Pain Orientation: Mid;Lower  Pain Radiating Towards: across lower back   Pain Descriptors: Sharp  Pain Frequency: Continuous  Pain Onset: On-going  Clinical Progression: Gradually improving  Functional Pain Assessment: Prevents or interferes some active activities and ADLs  Non-Pharmaceutical Pain Intervention(s): Ambulation/Increased Activity; Emotional support; Environmental changes;Relaxation techniques;Repositioned; Rest  Response to Pain Intervention: None  Multiple Pain Sites: No  Vital Signs  Patient Currently in Pain: Yes  Patient Observation  Observations: Limited mobility due to mid back pain.         Treatment Activities:      Bed mobility  Bridging: Independent  Transfers  Sit to Stand: Independent  Ambulation  Ambulation?: Yes  Ambulation 1  Surface: uneven;carpet;outdoors;level tile  Device: No Device  Assistance: Independent  Quality of Gait: Fair   Gait Deviations: Slow Parul;Deviated path  Distance: 250 ft   Comments: limited ability to walk due to mid back pain   Stairs/Curb  Stairs?: Yes  Stairs  # Steps : 5  Stairs Height: 6\"  Rails: Bilateral  Device: No Device; Single pt cane  Assistance: Independent  Comment: Limited ability to ascend and descend steps due to mid back pain      Balance  Posture: Poor  Sitting - Static: Good  Sitting - Dynamic: Good  Standing - Static: Good  Standing - Dynamic: Fair  Single Leg Stance R Le  Single Leg Stance L Le  AROM RUE (degrees)  RUE AROM : WFL  AROM LUE (degrees)  LUE AROM : WFL  Spine  Cervical: 50 % limited AROM in all directions   Thoracic: painful and limited extension   Lumbar: Not tested   Strength RUE  Strength RUE: WFL  Comment: mildly painful mid back   Strength LUE  Strength LUE: WFL  Comment: mildly painful mid back     Exercises  Exercise 1: HEP - postural changes -in Sitting with \"V\" pillow to lesson kyphosis and position head to neutral posture   Exercise 2: HEP - Sitting (B) UE - ER sitting in chair with \"V\" pillow 15 x   Exercise 3: HEP Avoid poor head posture - down and rotated right. Exercise 4: HEP Neck extension with towel 2x10 reps 25%  Exercise 5: Sitting chin tucks 15 x 2   Exercise 6: Sitting neck extensions to 25 % of normal with towel 30' x 2   Exercise 7: Rotations R/L with overpressure 30 '' x 2   Exercise 8: Side bending R/L 30 '' x 2 each side with over presser  Exercise 9: Sitting chin tuck 15 x 1   Exercise 10: Doorway strech 6 x 30 sec hold   Exercise 11: PREP - lower back prone laying 1-2 min -- LEONORA 1-2 min -- prone press ups 10 x 2 -- prone laying 1-2 min   Exercise 12: Prone laying 2 min   Exercise 13: LEONORA 2 min   Exercise 14: Prone press ups 10 x 2    Ortho Screen  Posture: poor         Assessment:   Conditions Requiring Skilled Therapeutic Intervention  Body structures, Functions, Activity limitations: Decreased functional mobility ; Decreased ADL status; Decreased ROM; Decreased strength;Decreased posture; Increased pain;Decreased balance;Decreased high-level IADLs  Assessment: The patient is doing well with her HEP and avoiding poor posture. The patient does have some issus with recall and performing her HEP. Treatment Diagnosis: Mid back pain   Prognosis: Good  Barriers to Learning: Pain   REQUIRES PT FOLLOW UP: Yes  Treatment Initiated : HEP   Discharge Recommendations: Home independently  Activity Tolerance  Activity Tolerance: Patient Tolerated treatment well;Patient limited by pain      Goals:  Short term goals  Time Frame for Short term goals: 2 weeks   Short term goal 1: OPTIMAL score of 12/3 at her evaluation, her goal is 9/3. (MET)  Long term goals  Time Frame for Long term goals : 6 weeks   Long term goal 1: OPTIMAL score of 6/3 at the time of her discharge. (NOT MET)  Long term goal 2:  Independent with her home program. (MET)  Patient Goals   Patient goals : Walk and move around without pain (MET)    Plan:      Continue   Timed Code Treatment Minutes: 45 Minutes  Total Treatment Time: 45  Frequency and duration of TX  Days: 2  Weeks: 6     Therapy Time   Individual Concurrent Group Co-treatment   Time In  1130         Time Out  1215         Minutes  45 min   Total  30 min   XRD  15 min   NRD          Timed Code Treatment Minutes: 70 Meaghan Promptonmichael [place] PT

## 2020-01-29 NOTE — PROGRESS NOTES
Physical Therapy  Daily Treatment Note  Date: 2020  Patient Name: Celestine Guo  MRN: [id number]     :   1950    Subjective:   General  Referring Practitioner: Dayana Schrader   PT Visit Information  Onset Date: 11/15/18  PT Insurance Information: [place] [employer]  Total # of Visits Approved: 24  Total # of Visits to Date: 16  Plan of Care/Certification Expiration Date: 20  No Show: 0  Canceled Appointment: 1  Progress Note Counter:   Subjective  Subjective: \"Sometimes my back feels like it needs to crack. \"  The patient reports using a twisting type movement to change the pain. She also reported doing the press ups 1-2 times a day in addition  to the neck exercises. Pain Screening  Patient Currently in Pain: Yes  Pain Assessment  Pain Assessment: 0-10  Pain Level: 6  Patient's Stated Pain Goal: No pain  Pain Type: Chronic pain  Pain Location: Back  Pain Orientation: Mid;Lower  Pain Radiating Towards: across lower back   Pain Descriptors: Sharp  Pain Frequency: Continuous  Pain Onset: On-going  Clinical Progression: Gradually improving  Functional Pain Assessment: Prevents or interferes some active activities and ADLs  Non-Pharmaceutical Pain Intervention(s): Ambulation/Increased Activity; Emotional support; Environmental changes;Relaxation techniques;Repositioned; Rest  Response to Pain Intervention: None  Multiple Pain Sites: No  Vital Signs  Patient Currently in Pain: Yes  Patient Observation  Observations: Limited mobility due to mid back pain.         Treatment Activities:      Bed mobility  Bridging: Independent  Transfers  Sit to Stand: Independent        Ambulation  Ambulation?: Yes  Ambulation 1  Surface: uneven;carpet;outdoors;level tile  Device: No Device  Assistance: Independent  Quality of Gait: Fair   Gait Deviations: Slow Parul;Deviated path  Distance: 250 ft   Comments: limited ability to walk due to mid back pain   Stairs/Curb  Stairs?: Yes  Stairs  # Steps : 5  Stairs Height: 6\"  Rails: Bilateral  Device: No Device; Single pt cane  Assistance: Independent  Comment: Limited ability to ascend and descend steps due to mid back pain      Balance  Posture: Poor  Sitting - Static: Good  Sitting - Dynamic: Good  Standing - Static: Good  Standing - Dynamic: Fair  Single Leg Stance R Le  Single Leg Stance L Le  AROM RUE (degrees)  RUE AROM : WFL  AROM LUE (degrees)  LUE AROM : WFL  Spine  Cervical: 50 % limited AROM in all directions   Thoracic: painful and limited extension   Lumbar: Not tested   Strength RUE  Strength RUE: WFL  Comment: mildly painful mid back   Strength LUE  Strength LUE: WFL  Comment: mildly painful mid back     Exercises  Exercise 1: HEP - postural changes -in Sitting with \"V\" pillow to lesson kyphosis and position head to neutral posture   Exercise 2: HEP - Sitting (B) UE - ER sitting in chair with \"V\" pillow 15 x   Exercise 3: HEP Avoid poor head posture - down and rotated right. Exercise 4: HEP Neck extension with towel 2x10 reps 25%  Exercise 5: Sitting chin tucks 15 x 2   Exercise 6: Sitting neck extensions to 25 % of normal with towel 30' x 2   Exercise 7: Rotations R/L with overpressure 30 '' x 2   Exercise 8: Side bending R/L 30 '' x 2 each side with over presser  Exercise 9: Sitting chin tuck 15 x 1   Exercise 10: Doorway strech 6 x 30 sec hold   Exercise 11: PREP - lower back prone laying 1-2 min -- LEONORA 1-2 min -- prone press ups 10 x 2 -- prone laying 1-2 min   Exercise 12: Prone laying 2 min   Exercise 13: LEONORA 2 min   Exercise 14: Prone press ups 10 x 2   Exercise 15: T bands red 3 row,pull downs, ER 15 x    Ortho Screen  Posture: poor       Assessment:    The patient is making good progress with the PT for her neck, will stop the treatment for her cervical spine and treat only her lower back starting next treatment.    Conditions Requiring Skilled Therapeutic Intervention  Body structures, Functions, Activity limitations: Decreased functional mobility ;Decreased ADL status; Decreased ROM; Decreased strength;Decreased posture; Increased pain;Decreased balance;Decreased high-level IADLs  Treatment Diagnosis: Mid back pain   Prognosis: Good  Barriers to Learning: Pain   REQUIRES PT FOLLOW UP: Yes  Treatment Initiated : HEP   Discharge Recommendations: Home independently  Activity Tolerance  Activity Tolerance: Patient Tolerated treatment well;Patient limited by pain      Goals:  Short term goals  Time Frame for Short term goals: 2 weeks   Short term goal 1: OPTIMAL score of 12/3 at the time of her evaluation, her goal is 9/3. (MET )  Long term goals  Time Frame for Long term goals : 6 weeks   Long term goal 1: OPTIMAL score of 6/3 at the time of her discharge. (NOT MET )  Long term goal 2:  Independent with her home program. (MET)  Patient Goals   Patient goals : Walk and move around without pain (MET)    Plan:     continue   Timed Code Treatment Minutes: 45 Minutes  Total Treatment Time: 45  Frequency and duration of TX  Days: 2  Weeks: 6     Therapy Time   Individual Concurrent Group Co-treatment   Time In  1130         Time Out  1215         Minutes  45 min   Total  30 min   XRD  15 min   NRD         Timed Code Treatment Minutes: [place]en 5, PT

## 2020-01-30 ENCOUNTER — OFFICE VISIT (OUTPATIENT)
Dept: ORTHOPEDIC SURGERY | Age: 70
End: 2020-01-30
Payer: MEDICARE

## 2020-01-30 PROCEDURE — 1090F PRES/ABSN URINE INCON ASSESS: CPT | Performed by: ORTHOPAEDIC SURGERY

## 2020-01-30 PROCEDURE — G8420 CALC BMI NORM PARAMETERS: HCPCS | Performed by: ORTHOPAEDIC SURGERY

## 2020-01-30 PROCEDURE — G8399 PT W/DXA RESULTS DOCUMENT: HCPCS | Performed by: ORTHOPAEDIC SURGERY

## 2020-01-30 PROCEDURE — 1123F ACP DISCUSS/DSCN MKR DOCD: CPT | Performed by: ORTHOPAEDIC SURGERY

## 2020-01-30 PROCEDURE — 3017F COLORECTAL CA SCREEN DOC REV: CPT | Performed by: ORTHOPAEDIC SURGERY

## 2020-01-30 PROCEDURE — G8484 FLU IMMUNIZE NO ADMIN: HCPCS | Performed by: ORTHOPAEDIC SURGERY

## 2020-01-30 PROCEDURE — 4040F PNEUMOC VAC/ADMIN/RCVD: CPT | Performed by: ORTHOPAEDIC SURGERY

## 2020-01-30 PROCEDURE — 4004F PT TOBACCO SCREEN RCVD TLK: CPT | Performed by: ORTHOPAEDIC SURGERY

## 2020-01-30 PROCEDURE — 99213 OFFICE O/P EST LOW 20 MIN: CPT | Performed by: ORTHOPAEDIC SURGERY

## 2020-01-30 PROCEDURE — G8427 DOCREV CUR MEDS BY ELIG CLIN: HCPCS | Performed by: ORTHOPAEDIC SURGERY

## 2020-01-30 NOTE — PROGRESS NOTES
Patient ID: Celestine Guo is a 77 y.o. female. Chief Complaint   Patient presents with    Follow-up     thoarcic pain         HPI    Refer to all of my previous clinic notes    Patient is here for follow-up of her thoracic and low back pain. Patient continues to do her physical therapy which she reports is helpful however she is continued to have a significant amount of pain. Patient incidentally complains of pain in her low back with deep breath        Past Medical History:   Diagnosis Date    Breast mass, right     H/O abnormal Pap smear     had LEEP    Lesion of skin of face     forehead    MVP (mitral valve prolapse)     Psoriasis      Past Surgical History:   Procedure Laterality Date    BREAST SURGERY Right 3-25-15    BIOPSY W/ NEEDLE LOCALIZATION.  LEEP      SKIN BIOPSY Right 3-25-15    FOREHEAD SKIN LESION    TONSILLECTOMY       No family history on file. Social History     Occupational History    Not on file   Tobacco Use    Smoking status: Current Every Day Smoker     Packs/day: 1.00     Years: 40.00     Pack years: 40.00    Tobacco comment: H/O 4 packs per day, down to 1 pack per day as of 3/11/2015   Substance and Sexual Activity    Alcohol use: Not on file    Drug use: No    Sexual activity: Not on file        Review of Systems         Physical Exam  Vitals signs and nursing note reviewed. Constitutional:       Appearance: She is well-developed. HENT:      Head: Normocephalic and atraumatic. Nose: Nose normal.   Eyes:      Conjunctiva/sclera: Conjunctivae normal.   Neck:      Musculoskeletal: Normal range of motion and neck supple. Pulmonary:      Effort: Pulmonary effort is normal. No respiratory distress. Musculoskeletal:      Comments: Normal gait     Skin:     General: Skin is warm and dry. Neurological:      Mental Status: She is alert and oriented to person, place, and time. Sensory: No sensory deficit.    Psychiatric:         Behavior: Behavior normal.         Thought Content: Thought content normal.         Assessment:     Failure of 6 weeks physical therapy for thoracic and low back    1. Acute midline thoracic back pain    2. Acute low back pain, unspecified back pain laterality, unspecified whether sciatica present        Plan:     MRI lumbar spine    Follow-up post above    No orders of the defined types were placed in this encounter. Bev Car MD    Please note that this chart was generated using voicerecognition Dragon dictation software. Although every effort was made to ensurethe accuracy of this automated transcription, some errors in transcription may haveoccurred.

## 2020-02-03 ENCOUNTER — HOSPITAL ENCOUNTER (OUTPATIENT)
Dept: PHYSICAL THERAPY | Age: 70
Setting detail: THERAPIES SERIES
Discharge: HOME | End: 2020-02-03
Payer: MEDICARE

## 2020-02-03 PROCEDURE — 97110 THERAPEUTIC EXERCISES: CPT

## 2020-02-03 ASSESSMENT — PAIN SCALES - GENERAL: PAINLEVEL_OUTOF10: 6

## 2020-02-03 ASSESSMENT — PAIN DESCRIPTION - ONSET: ONSET: ON-GOING

## 2020-02-03 ASSESSMENT — PAIN DESCRIPTION - DESCRIPTORS: DESCRIPTORS: SHARP

## 2020-02-03 ASSESSMENT — PAIN DESCRIPTION - ORIENTATION: ORIENTATION: LOWER;MID

## 2020-02-03 ASSESSMENT — PAIN DESCRIPTION - PAIN TYPE: TYPE: CHRONIC PAIN

## 2020-02-03 ASSESSMENT — PAIN DESCRIPTION - LOCATION: LOCATION: BACK

## 2020-02-03 ASSESSMENT — PAIN DESCRIPTION - PROGRESSION: CLINICAL_PROGRESSION: GRADUALLY IMPROVING

## 2020-02-03 ASSESSMENT — PAIN - FUNCTIONAL ASSESSMENT: PAIN_FUNCTIONAL_ASSESSMENT: PREVENTS OR INTERFERES SOME ACTIVE ACTIVITIES AND ADLS

## 2020-02-03 ASSESSMENT — PAIN DESCRIPTION - FREQUENCY: FREQUENCY: CONTINUOUS

## 2020-02-04 NOTE — PROGRESS NOTES
Tolerance: Patient Tolerated treatment well;Patient limited by pain        Goals:  Short term goals  Time Frame for Short term goals: 2 weeks   Short term goal 1: OPTIMAL score of 12/3 at her evaluation, her goal is 9/3. (MET )  Long term goals  Time Frame for Long term goals : 6 weeks   Long term goal 1: OPTIMAL score of 6/3 at the time of her discharge. (NOT MET )  Long term goal 2:  Independent with her home program. (MET)  Patient Goals   Patient goals : Walk and move around without pain (MET)    Plan:     Continue  Timed Code Treatment Minutes: 30 Minutes  Total Treatment Time: 30  Frequency and duration of TX  Days: 2  Weeks: 6     Therapy Time   Individual Concurrent Group Co-treatment   Time In  1200         Time Out  1230         Minutes  30 min   Total         Timed Code Treatment Minutes: 30 Minutes     Treatment Charges: Minutes Units   []  Ultrasound     []  Electrical-Stim     []  Iontophoresis     []  Traction     []  Massage       []  Eval     []  Gait     [x]  Ther Exercise 30  2    []  Manual Therapy       []  Ther Activities       []  Aquatics     []  Vasopneumatic Device     []  Neuro Re-Ed       []  Other       Total Treatment Time: 30 2       Jose Manzo, PT

## 2020-02-05 ENCOUNTER — HOSPITAL ENCOUNTER (OUTPATIENT)
Dept: PHYSICAL THERAPY | Age: 70
Setting detail: THERAPIES SERIES
Discharge: HOME | End: 2020-02-05
Payer: MEDICARE

## 2020-02-05 PROCEDURE — 97112 NEUROMUSCULAR REEDUCATION: CPT

## 2020-02-05 PROCEDURE — 97110 THERAPEUTIC EXERCISES: CPT

## 2020-02-05 ASSESSMENT — PAIN DESCRIPTION - PROGRESSION: CLINICAL_PROGRESSION: GRADUALLY IMPROVING

## 2020-02-05 ASSESSMENT — PAIN DESCRIPTION - PAIN TYPE: TYPE: CHRONIC PAIN

## 2020-02-05 ASSESSMENT — PAIN DESCRIPTION - FREQUENCY: FREQUENCY: CONTINUOUS

## 2020-02-05 ASSESSMENT — PAIN SCALES - GENERAL: PAINLEVEL_OUTOF10: 4

## 2020-02-05 ASSESSMENT — PAIN DESCRIPTION - LOCATION: LOCATION: BACK

## 2020-02-05 ASSESSMENT — PAIN DESCRIPTION - ONSET: ONSET: PROGRESSIVE

## 2020-02-05 ASSESSMENT — PAIN - FUNCTIONAL ASSESSMENT: PAIN_FUNCTIONAL_ASSESSMENT: PREVENTS OR INTERFERES SOME ACTIVE ACTIVITIES AND ADLS

## 2020-02-05 ASSESSMENT — PAIN DESCRIPTION - DESCRIPTORS: DESCRIPTORS: STABBING

## 2020-02-05 NOTE — PROGRESS NOTES
Physical Therapy  Daily Treatment Note  Date: 2020  Patient Name: Silva Kimble  MRN: [id number]     :   1950    Subjective:   General  Referring Practitioner: Elaina Pedersen   PT Visit Information  Onset Date: 11/15/18  PT Insurance Information: [employer]  Total # of Visits Approved: 24  Total # of Visits to Date: 23  Plan of Care/Certification Expiration Date: 20  No Show: 0  Canceled Appointment: 1  Progress Note Counter:   Subjective  Subjective: My back is real sore today. Pain Screening  Patient Currently in Pain: Yes  Pain Assessment  Pain Assessment: 0-10  Pain Level: 4  Patient's Stated Pain Goal: No pain  Pain Type: Chronic pain  Pain Location: Back  Pain Radiating Towards: (across lower back )  Pain Descriptors: Stabbing  Pain Frequency: Continuous  Pain Onset: Progressive  Clinical Progression: Gradually improving  Functional Pain Assessment: Prevents or interferes some active activities and ADLs  Non-Pharmaceutical Pain Intervention(s): Ambulation/Increased Activity  Response to Pain Intervention: None  Multiple Pain Sites: No  Vital Signs  Patient Currently in Pain: Yes  Patient Observation  Observations: Limited mobility due to mid back pain. Treatment Activities:      Bed mobility  Bridging: Independent  Transfers  Sit to Stand: Independent        Ambulation  Ambulation?: Yes  Ambulation 1  Surface: uneven;carpet;outdoors;level tile  Device: No Device  Assistance: Independent  Quality of Gait: Fair   Gait Deviations: Slow Parul;Deviated path  Distance: 450 ft   Comments: limited ability to walk due to mid back pain   Stairs/Curb  Stairs?: Yes  Stairs  # Steps : 5  Stairs Height: 6\"  Rails: Bilateral  Device: No Device; Single pt cane  Assistance: Independent  Comment: Limited ability to ascend and descend steps due to mid back pain      Balance  Posture: Poor  Sitting - Static: Good  Sitting - Dynamic: Good  Standing - Static: Good  Standing - Dynamic: Fair  Single Leg Stance R Le  Single Leg Stance L Le  AROM RUE (degrees)  RUE AROM : WFL  AROM LUE (degrees)  LUE AROM : WFL  Spine  Cervical: 50 % limited AROM in all directions   Thoracic: painful and limited extension   Lumbar: Not tested   Strength RUE  Strength RUE: WFL  Comment: mildly painful mid back   Strength LUE  Strength LUE: WFL  Comment: mildly painful mid back     Exercises  Exercise 1: HEP - postural changes -in  Sitting with \"V\" pillow to lesson kyphosis and position head to neutral posture   Exercise 2: HEP - Sitting (B) UE - ER sitting in chair with \"V\" pillow 15 x   Exercise 3: HEP Avoid poor head posture - down and rotated right. Exercise 4: HEP Neck extension with towel 2x10 reps 25%  Exercise 5: Sitting chin tucks 15 x 2   Exercise 6: Sitting neck extensions to 25 % of normal with towel 30' x 2   Exercise 7: Rotations R/L with overpressure 30 '' x 2   Exercise 8: Side bending R/L 30 '' x 2 each side with over presser  Exercise 9: Sitting chin tuck 15 x 1   Exercise 10: Doorway strech 6 x 30 sec hold   Exercise 11: PREP - lower back prone laying 1-2 min -- LEONORA 1-2 min -- prone press ups 10 x 2 -- prone laying 1-2 min   Exercise 12: Prone laying 2 min   Exercise 13: LEONORA 2 min   Exercise 14: Prone press ups 10 x 2   Exercise 15: T bands red 3 row,pull downs, ER 15 x            Ortho Screen  Posture: poor       Assessment:   Conditions Requiring Skilled Therapeutic Intervention  Body structures, Functions, Activity limitations: Decreased functional mobility ; Decreased ADL status; Decreased ROM; Decreased strength;Decreased posture; Increased pain;Decreased balance;Decreased high-level IADLs  Treatment Diagnosis: Mid back pain   Prognosis: Good  Barriers to Learning: Pain   REQUIRES PT FOLLOW UP: Yes  Treatment Initiated : HEP   Discharge Recommendations: Home independently  Activity Tolerance  Activity Tolerance: Patient Tolerated treatment well;Patient limited by pain      Goals:  Short term goals  Time

## 2020-02-06 RX ORDER — NAPROXEN 500 MG/1
TABLET ORAL
Qty: 60 TABLET | Refills: 0 | Status: SHIPPED | OUTPATIENT
Start: 2020-02-06 | End: 2020-03-05

## 2020-02-10 ENCOUNTER — APPOINTMENT (OUTPATIENT)
Dept: PHYSICAL THERAPY | Age: 70
End: 2020-02-10
Payer: MEDICARE

## 2020-02-12 ENCOUNTER — APPOINTMENT (OUTPATIENT)
Dept: PHYSICAL THERAPY | Age: 70
End: 2020-02-12
Payer: MEDICARE

## 2020-02-15 ENCOUNTER — HOSPITAL ENCOUNTER (OUTPATIENT)
Dept: MRI IMAGING | Age: 70
Discharge: HOME | End: 2020-02-17
Payer: MEDICARE

## 2020-02-15 PROCEDURE — 72148 MRI LUMBAR SPINE W/O DYE: CPT

## 2020-02-17 ENCOUNTER — HOSPITAL ENCOUNTER (OUTPATIENT)
Dept: PHYSICAL THERAPY | Age: 70
Setting detail: THERAPIES SERIES
Discharge: HOME | End: 2020-02-17
Payer: MEDICARE

## 2020-02-17 PROCEDURE — 97110 THERAPEUTIC EXERCISES: CPT

## 2020-02-17 ASSESSMENT — PAIN DESCRIPTION - DESCRIPTORS: DESCRIPTORS: SHARP

## 2020-02-17 ASSESSMENT — PAIN SCALES - GENERAL: PAINLEVEL_OUTOF10: 3

## 2020-02-17 ASSESSMENT — PAIN DESCRIPTION - PAIN TYPE: TYPE: CHRONIC PAIN

## 2020-02-17 ASSESSMENT — PAIN DESCRIPTION - PROGRESSION: CLINICAL_PROGRESSION: NOT CHANGED

## 2020-02-17 ASSESSMENT — PAIN DESCRIPTION - FREQUENCY: FREQUENCY: CONTINUOUS

## 2020-02-17 ASSESSMENT — PAIN - FUNCTIONAL ASSESSMENT: PAIN_FUNCTIONAL_ASSESSMENT: PREVENTS OR INTERFERES SOME ACTIVE ACTIVITIES AND ADLS

## 2020-02-17 ASSESSMENT — PAIN DESCRIPTION - LOCATION: LOCATION: BACK

## 2020-02-17 ASSESSMENT — PAIN DESCRIPTION - ONSET: ONSET: PROGRESSIVE

## 2020-02-17 NOTE — PROGRESS NOTES
Physical Therapy  Daily Treatment Note  Date: 2020  Patient Name: Magda Muñoz  MRN: [id number]     :   1950    Subjective:   General  Referring Practitioner: Yary Burns   PT Visit Information  Onset Date: 11/15/18  PT Insurance Information: [place] [employer]  Total # of Visits Approved: 24  Total # of Visits to Date:   Plan of Care/Certification Expiration Date: 20  No Show: 0  Canceled Appointment: 1  Progress Note Counter:   Subjective  Subjective: \"I did my neck stretches while I was waiting to day. \"  I only need to work my back in PT today. \"  I had an MRI and I did not get the results of my test yet. \"   Pain Screening  Patient Currently in Pain: Yes  Pain Assessment  Pain Assessment: 0-10  Pain Level: 3  Patient's Stated Pain Goal: No pain  Pain Type: Chronic pain  Pain Location: Back  Pain Descriptors: Sharp  Pain Frequency: Continuous  Pain Onset: Progressive  Clinical Progression: Not changed  Functional Pain Assessment: Prevents or interferes some active activities and ADLs  Non-Pharmaceutical Pain Intervention(S): Ambulation/Increased Activity; Emotional support; Environmental changes; Heat applied; Rest;Repositioned;Relaxation techniques  Response to Pain Intervention: None  Multiple Pain Sites: No  Vital Signs  Patient Currently in Pain: Yes  Patient Observation  Observations: Limited mobility due to mid back pain. Treatment Activities:      Bed mobility  Bridging: Independent  Transfers  Sit to Stand: Independent        Ambulation  Ambulation?: Yes  Ambulation 1  Surface: uneven;carpet;outdoors;level tile  Device: No Device  Assistance: Independent  Quality of Gait: Fair   Gait Deviations: Slow Parul;Deviated path  Distance: 450 ft   Comments: limited ability to walk due to mid back pain   Stairs/Curb  Stairs?: Yes  Stairs  # Steps : 5  Stairs Height: 6\"  Rails: Bilateral  Device: No Device; Single pt cane  Assistance: Independent  Comment: Limited ability to as send and descend steps due to mid back pain      Balance  Posture: Poor  Sitting - Static: Good  Sitting - Dynamic: Good  Standing - Static: Good  Standing - Dynamic: Fair  Single Leg Stance R Le  Single Leg Stance L Le  AROM RUE (degrees)  RUE AROM : WFL  AROM LUE (degrees)  LUE AROM : WFL  Spine  Cervical: 50 % limited AROM in all directions   Thoracic: painful and limited extension   Lumbar: Not tested   Strength RUE  Strength RUE: WFL  Comment: mildly painful mid back   Strength LUE  Strength LUE: WFL  Comment: mildly painful mid back     Exercises  Exercise 1: HEP - postural changes -in Sitting with \"V\" pillow to lesson kyphosis and position head to neutral posture   Exercise 2: HEP - Sitting (B) UE - ER sitting in chair with \"V\" pillow 15 x   Exercise 3: HEP Avoid poor head posture - down and rotated right. Exercise 4: HEP Neck extension with towel 2x10 reps 25%  Exercise 5: Improved rising  Exercise 6: Avoid lifting, bending, prolonged standing. Exercise 7: sit with a roll 100% of the time  Exercise 10: Doorway strech 6 x 30 sec hold   Exercise 11: PREP - lower back prone laying 1-2 min -- LEONORA 1-2 min -- prone press ups 10 x 2 -- prone laying 1-2 min   Exercise 12: Prone laying 2 min   Exercise 13: LEONORA 2 min   Exercise 14: Prone press ups 10 x 2   Exercise 15: T bands red 3 row,pull downs, ER 15 x       Ortho Screen  Posture: poor       Assessment:   Conditions Requiring Skilled Therapeutic Intervention  Body structures, Functions, Activity limitations: Decreased functional mobility ; Decreased ADL status; Decreased ROM; Decreased strength;Decreased posture; Increased pain;Decreased balance;Decreased high-level IADLs  Assessment: The patient's back pain is not changed with light exercise and postioning changes. Will continue 2 x a week as tolerated.    Treatment Diagnosis: Mid back pain   Prognosis: Good  Barriers to Learning: Pain   REQUIRES PT FOLLOW UP: Yes  Treatment Initiated : HEP   Discharge Recommendations: Home independently  Activity Tolerance  Activity Tolerance: Patient Tolerated treatment well;Patient limited by pain      Goals:  Short term goals  Time Frame for Short term goals: 2 weeks   Short term goal 1: OPTIMAL score of 12/3 at her evaluation, her goal is 9/3. (MET)    Long term goals  Time Frame for Long term goals : 6 weeks   Long term goal 1: OPTIMAL score of 6/3 at the time of her discharge. (NOT MET)  Long term goal 2:  Independent with her home program. (MET)    Patient Goals   Patient goals : Walk and move around without pain (MET)    Plan:     continue  Timed Code Treatment Minutes: 15 Minutes  Total Treatment Time: 30  Frequency and duration of TX  Days: 2  Weeks: 6     Therapy Time   Individual Concurrent Group Co-treatment   Time In  1000         Time Out  1030         Minutes  30 min  Total         Timed Code Treatment Minutes: 15 Minutes      Treatment Charges: Minutes Units   []  Ultrasound     []  Electrical-Stim     []  Iontophoresis     []  Traction     []  Massage       []  Eval     []  Gait     [x]  Ther Exercise 15  1    []  Manual Therapy       []  Ther Activities       []  Aquatics     []  Vasopneumatic Device     []  Neuro Re-Ed       [x]  Other N/C - HP   15 0    Total Treatment Time: 30 1          Jose Manzo, PT

## 2020-02-19 ENCOUNTER — HOSPITAL ENCOUNTER (OUTPATIENT)
Dept: PHYSICAL THERAPY | Age: 70
Setting detail: THERAPIES SERIES
Discharge: HOME | End: 2020-02-19
Payer: MEDICARE

## 2020-02-19 PROCEDURE — 97110 THERAPEUTIC EXERCISES: CPT

## 2020-02-19 ASSESSMENT — PAIN DESCRIPTION - PROGRESSION: CLINICAL_PROGRESSION: NOT CHANGED

## 2020-02-19 ASSESSMENT — PAIN DESCRIPTION - DESCRIPTORS: DESCRIPTORS: SHARP

## 2020-02-19 ASSESSMENT — PAIN - FUNCTIONAL ASSESSMENT: PAIN_FUNCTIONAL_ASSESSMENT: PREVENTS OR INTERFERES SOME ACTIVE ACTIVITIES AND ADLS

## 2020-02-19 ASSESSMENT — PAIN DESCRIPTION - LOCATION: LOCATION: BACK

## 2020-02-19 ASSESSMENT — PAIN SCALES - GENERAL: PAINLEVEL_OUTOF10: 3

## 2020-02-19 ASSESSMENT — PAIN DESCRIPTION - PAIN TYPE: TYPE: CHRONIC PAIN

## 2020-02-19 ASSESSMENT — PAIN DESCRIPTION - FREQUENCY: FREQUENCY: CONTINUOUS

## 2020-02-19 ASSESSMENT — PAIN DESCRIPTION - ONSET: ONSET: PROGRESSIVE

## 2020-02-25 ENCOUNTER — OFFICE VISIT (OUTPATIENT)
Dept: ORTHOPEDIC SURGERY | Age: 70
End: 2020-02-25
Payer: MEDICARE

## 2020-02-25 PROCEDURE — G8399 PT W/DXA RESULTS DOCUMENT: HCPCS | Performed by: ORTHOPAEDIC SURGERY

## 2020-02-25 PROCEDURE — 3017F COLORECTAL CA SCREEN DOC REV: CPT | Performed by: ORTHOPAEDIC SURGERY

## 2020-02-25 PROCEDURE — 1090F PRES/ABSN URINE INCON ASSESS: CPT | Performed by: ORTHOPAEDIC SURGERY

## 2020-02-25 PROCEDURE — G8420 CALC BMI NORM PARAMETERS: HCPCS | Performed by: ORTHOPAEDIC SURGERY

## 2020-02-25 PROCEDURE — G8484 FLU IMMUNIZE NO ADMIN: HCPCS | Performed by: ORTHOPAEDIC SURGERY

## 2020-02-25 PROCEDURE — 1123F ACP DISCUSS/DSCN MKR DOCD: CPT | Performed by: ORTHOPAEDIC SURGERY

## 2020-02-25 PROCEDURE — G8427 DOCREV CUR MEDS BY ELIG CLIN: HCPCS | Performed by: ORTHOPAEDIC SURGERY

## 2020-02-25 PROCEDURE — 4040F PNEUMOC VAC/ADMIN/RCVD: CPT | Performed by: ORTHOPAEDIC SURGERY

## 2020-02-25 PROCEDURE — 4004F PT TOBACCO SCREEN RCVD TLK: CPT | Performed by: ORTHOPAEDIC SURGERY

## 2020-02-25 PROCEDURE — 99213 OFFICE O/P EST LOW 20 MIN: CPT | Performed by: ORTHOPAEDIC SURGERY

## 2020-02-25 NOTE — PROGRESS NOTES
Patient ID: Hao Mata is a 77 y.o. female    Chief Compliant:  Chief Complaint   Patient presents with    Follow-up     mri lumbar         History of Present Illness:    Refer to previous clinic notes. 77 y.o. female who presents for follow up of thoracic and low back pain. She reports therapy has been helping making the pain tolerable. She does mention she is having radicular leg pain left greater than right. Review of Systems   Constitutional: Negative for fever, sweats, weight loss, recent injury, or recent illness  Neurological: Negative for  headaches, numbness, or focal weakness. Integumentary: Negative for abrasion, laceration, rash, ecchymosis.    Musculoskeletal: Positive for Follow-up (mri lumbar )       Past History:    Current Outpatient Medications:     naproxen (NAPROSYN) 500 MG tablet, TAKE 1 TABLET BY MOUTH TWICE DAILY WITH MEALS, Disp: 60 tablet, Rfl: 0    ibuprofen (ADVIL;MOTRIN) 200 MG tablet, Take 400 mg by mouth every 6 hours as needed for Pain., Disp: , Rfl:   No Known Allergies  Social History     Socioeconomic History    Marital status:      Spouse name: Not on file    Number of children: Not on file    Years of education: Not on file    Highest education level: Not on file   Occupational History    Not on file   Social Needs    Financial resource strain: Not on file    Food insecurity:     Worry: Not on file     Inability: Not on file    Transportation needs:     Medical: Not on file     Non-medical: Not on file   Tobacco Use    Smoking status: Current Every Day Smoker     Packs/day: 1.00     Years: 40.00     Pack years: 40.00    Tobacco comment: H/O 4 packs per day, down to 1 pack per day as of 3/11/2015   Substance and Sexual Activity    Alcohol use: Not on file    Drug use: No    Sexual activity: Not on file   Lifestyle    Physical activity:     Days per week: Not on file     Minutes per session: Not on file    Stress: Not on file therapy at this time. Epidural steroid injections    Advised to continue therapy and home exercises. Follow up in 12 weeks. Scribe Attestation:  By signing my name below, Romina Thao, attstar that this documentation has been prepared under the direction and in the presence of Dr. Cassandra Feliz. Electronically signed: Margo Wu, 2/25/20     Please note that this chart was generated using voice recognition Dragon dictation software. Although every effort was made to ensure the accuracy of this automated transcription, some errors in transcription may have occurred.

## 2020-03-05 ENCOUNTER — HOSPITAL ENCOUNTER (OUTPATIENT)
Dept: PHYSICAL THERAPY | Age: 70
Setting detail: THERAPIES SERIES
Discharge: HOME | End: 2020-03-05
Payer: MEDICARE

## 2020-03-05 PROCEDURE — 97112 NEUROMUSCULAR REEDUCATION: CPT

## 2020-03-05 PROCEDURE — 97110 THERAPEUTIC EXERCISES: CPT

## 2020-03-05 RX ORDER — NAPROXEN 500 MG/1
TABLET ORAL
Qty: 60 TABLET | Refills: 0 | Status: SHIPPED | OUTPATIENT
Start: 2020-03-05 | End: 2020-04-01

## 2020-03-05 ASSESSMENT — PAIN SCALES - GENERAL: PAINLEVEL_OUTOF10: 8

## 2020-03-05 ASSESSMENT — PAIN DESCRIPTION - LOCATION: LOCATION: BACK

## 2020-03-05 ASSESSMENT — PAIN DESCRIPTION - DESCRIPTORS: DESCRIPTORS: SHARP

## 2020-03-05 ASSESSMENT — PAIN DESCRIPTION - PROGRESSION: CLINICAL_PROGRESSION: NOT CHANGED

## 2020-03-05 ASSESSMENT — PAIN - FUNCTIONAL ASSESSMENT: PAIN_FUNCTIONAL_ASSESSMENT: PREVENTS OR INTERFERES SOME ACTIVE ACTIVITIES AND ADLS

## 2020-03-05 ASSESSMENT — PAIN DESCRIPTION - FREQUENCY: FREQUENCY: CONTINUOUS

## 2020-03-05 ASSESSMENT — PAIN DESCRIPTION - ONSET: ONSET: PROGRESSIVE

## 2020-03-05 ASSESSMENT — PAIN DESCRIPTION - PAIN TYPE: TYPE: CHRONIC PAIN

## 2020-03-05 NOTE — PROGRESS NOTES
Physical Therapy  Daily Treatment Note  Date: 3/5/2020  Patient Name: Nestor Velez  MRN: [id number]     :   1950    Subjective:   General  Referring Practitioner: Cassidy Guardado   PT Visit Information  Onset Date: 11/15/18  PT Insurance Information: [place] [employer]  Total # of Visits Approved: 24  Total # of Visits to Date: 25  Plan of Care/Certification Expiration Date: 20  No Show: 0  Canceled Appointment: 1  Progress Note Counter:   Subjective  Subjective: \"My lower back is not better. \"   ''My neck is okay, because of less pain. \"  The patient reports doing a good job with her neck program but not the lower back exercise because her pain is too much. \"It still hurts. \"   Pain Screening  Patient Currently in Pain: Yes  Pain Assessment  Pain Assessment: 0-10  Pain Level: 8  Patient's Stated Pain Goal: No pain  Pain Type: Chronic pain  Pain Location: Back  Pain Descriptors: Sharp  Pain Frequency: Continuous  Pain Onset: Progressive  Clinical Progression: Not changed  Functional Pain Assessment: Prevents or interferes some active activities and ADLs  Non-Pharmaceutical Pain Intervention(s): Ambulation/Increased Activity; Heat applied;Guided imagery; Environmental changes; Emotional support  Response to Pain Intervention: None  Multiple Pain Sites: Yes(neck )  Vital Signs  Patient Currently in Pain: Yes  Patient Observation  Observations: Limited mobility due to mid back pain. Treatment Activities:      Bed mobility  Bridging: Independent  Transfers  Sit to Stand: Independent        Ambulation  Ambulation?: Yes  Ambulation 1  Surface: uneven;carpet;outdoors;level tile  Device: No Device  Assistance: Independent  Quality of Gait: Fair   Gait Deviations: Slow Parul;Deviated path  Distance: 450 ft   Comments: limited ability to walk due to mid back pain   Stairs/Curb  Stairs?: Yes  Stairs  # Steps : 5  Stairs Height: 6\"  Rails: Bilateral  Device: No Device; Single pt cane  Assistance: Independent  Comment: Limited ability to ascend and descend steps due to mid back pain      Balance  Posture: Poor  Sitting - Static: Good  Sitting - Dynamic: Good  Standing - Static: Good  Standing - Dynamic: Fair  Single Leg Stance R Le  Single Leg Stance L Le  AROM RUE (degrees)  RUE AROM : WFL  AROM LUE (degrees)  LUE AROM : WFL  Spine  Cervical: 50 % limited AROM in all directions   Thoracic: painful and limited extension   Lumbar: Not tested   Strength RUE  Strength RUE: WFL  Comment: mildly painful mid back   Strength LUE  Strength LUE: WFL  Comment: mildly painful mid back     Exercises  Exercise 1: HEP - postural changes -in Sitting with \"V\" pillow to lesson kyphosis and position head to neutral posture   Exercise 2: HEP - Sitting (B) UE - ER sitting in chair with \"V\" pillow 15 x   Exercise 3: HEP Avoid poor head posture - down and rotated right. Exercise 4: HEP Neck extension with towel 2x10 reps 25%  Exercise 5: Improved rising  Exercise 6: Avoid lifting, bending, prolonged standing. Exercise 7: sit with a roll 100% of the time  Exercise 10: Doorway strech 6 x 30 sec hold   Exercise 11: PREP - lower back prone laying 1-2 min -- LEONORA 1-2 min -- prone press ups 10 x 2 -- prone laying 1-2 min   Exercise 12: Prone laying 2 min   Exercise 13: LEONORA 2 min   Exercise 14: Prone press ups 10 x 2   Exercise 15: T bands red 3 row,pull downs, ER 15 x            Ortho Screen  Posture: poor         Assessment:   Conditions Requiring Skilled Therapeutic Intervention  Body structures, Functions, Activity limitations: Decreased functional mobility ; Decreased ADL status; Decreased ROM; Decreased strength;Decreased posture; Increased pain;Decreased balance;Decreased high-level IADLs  Treatment Diagnosis: Mid back pain   Prognosis: Good  Barriers to Learning: Pain   REQUIRES PT FOLLOW UP: Yes  Treatment Initiated : HEP   Discharge Recommendations: Home independently  Activity Tolerance  Activity Tolerance: Patient

## 2020-03-09 ENCOUNTER — HOSPITAL ENCOUNTER (OUTPATIENT)
Dept: PHYSICAL THERAPY | Age: 70
Setting detail: THERAPIES SERIES
Discharge: HOME | End: 2020-03-09
Payer: MEDICARE

## 2020-03-09 ENCOUNTER — HOSPITAL ENCOUNTER (EMERGENCY)
Age: 70
Discharge: HOME | End: 2020-03-09
Attending: EMERGENCY MEDICINE
Payer: MEDICARE

## 2020-03-09 VITALS
TEMPERATURE: 98 F | WEIGHT: 110 LBS | SYSTOLIC BLOOD PRESSURE: 156 MMHG | HEIGHT: 60 IN | HEART RATE: 115 BPM | BODY MASS INDEX: 21.6 KG/M2 | RESPIRATION RATE: 18 BRPM | DIASTOLIC BLOOD PRESSURE: 57 MMHG | OXYGEN SATURATION: 100 %

## 2020-03-09 PROCEDURE — 97110 THERAPEUTIC EXERCISES: CPT

## 2020-03-09 PROCEDURE — 6370000000 HC RX 637 (ALT 250 FOR IP): Performed by: PHYSICIAN ASSISTANT

## 2020-03-09 PROCEDURE — 99283 EMERGENCY DEPT VISIT LOW MDM: CPT

## 2020-03-09 RX ORDER — HYDROCODONE BITARTRATE AND ACETAMINOPHEN 5; 325 MG/1; MG/1
1 TABLET ORAL ONCE
Status: COMPLETED | OUTPATIENT
Start: 2020-03-09 | End: 2020-03-09

## 2020-03-09 RX ORDER — CYCLOBENZAPRINE HCL 10 MG
10 TABLET ORAL ONCE
Status: COMPLETED | OUTPATIENT
Start: 2020-03-09 | End: 2020-03-09

## 2020-03-09 RX ORDER — HYDROCODONE BITARTRATE AND ACETAMINOPHEN 5; 325 MG/1; MG/1
1 TABLET ORAL EVERY 6 HOURS PRN
Qty: 10 TABLET | Refills: 0 | Status: SHIPPED | OUTPATIENT
Start: 2020-03-09 | End: 2020-03-12

## 2020-03-09 RX ORDER — CYCLOBENZAPRINE HCL 10 MG
10 TABLET ORAL 3 TIMES DAILY PRN
Qty: 20 TABLET | Refills: 0 | Status: SHIPPED | OUTPATIENT
Start: 2020-03-09 | End: 2020-03-16

## 2020-03-09 RX ADMIN — CYCLOBENZAPRINE HYDROCHLORIDE 10 MG: 10 TABLET, FILM COATED ORAL at 17:06

## 2020-03-09 RX ADMIN — HYDROCODONE BITARTRATE AND ACETAMINOPHEN 1 TABLET: 5; 325 TABLET ORAL at 17:06

## 2020-03-09 ASSESSMENT — PAIN SCALES - GENERAL
PAINLEVEL_OUTOF10: 9

## 2020-03-09 ASSESSMENT — PAIN DESCRIPTION - ORIENTATION: ORIENTATION: LOWER;MID

## 2020-03-09 ASSESSMENT — PAIN DESCRIPTION - LOCATION: LOCATION: BACK

## 2020-03-09 ASSESSMENT — PAIN DESCRIPTION - DESCRIPTORS: DESCRIPTORS: SHARP

## 2020-03-09 ASSESSMENT — PAIN DESCRIPTION - PAIN TYPE: TYPE: CHRONIC PAIN

## 2020-03-09 ASSESSMENT — PAIN DESCRIPTION - PROGRESSION: CLINICAL_PROGRESSION: NOT CHANGED

## 2020-03-09 ASSESSMENT — PAIN - FUNCTIONAL ASSESSMENT: PAIN_FUNCTIONAL_ASSESSMENT: PREVENTS OR INTERFERES SOME ACTIVE ACTIVITIES AND ADLS

## 2020-03-09 ASSESSMENT — PAIN DESCRIPTION - FREQUENCY: FREQUENCY: CONTINUOUS

## 2020-03-09 ASSESSMENT — PAIN DESCRIPTION - ONSET: ONSET: PROGRESSIVE

## 2020-03-09 NOTE — ED PROVIDER NOTES
[address] ED  eMERGENCY dEPARTMENT eNCOUnter      Pt Name: Giovanni Luis  MRN: [id number]  [place] 1950  Date of evaluation: 3/9/2020  Provider: EDMOND Garza    CHIEF COMPLAINT       Chief Complaint   Patient presents with    Back Pain           HISTORY OF PRESENT ILLNESS  (Location/Symptom, Timing/Onset, Context/Setting, Quality, Duration, Modifying Factors, Severity.)   Giovanni Luis is a 77 y.o. female who presents to the emergency department complaining of a flareup of her chronic low back pain. She denies any direct injury, states that she saw a specialist who referred her to pain management. She has her appointment on the 16th. Patient has a history of chronic back pain. No loss of bowel or bladder control, no numbness or tingling  Patient denies any history of IV drug use, denies any fevers, chills, abdominal pain nausea or vomiting    Location/Symptom: Lower back lumbar back  Quality: Aching  Duration: Persistent  Modifying Factors: Worse with bending and twisting  Severity: Mild    Nursing Notes were reviewed. REVIEW OF SYSTEMS    (2-9 systems for level 4, 10 or more for level 5)     Review of Systems   Constitutional: Negative. Respiratory: Negative. Cardiovascular: Negative. Gastrointestinal: Negative. Musculoskeletal: Complaining of back pain  Genitourinary: Negative. Skin: Negative. Neurological: Negative. Except as noted above the remainder of the review of systems was reviewed and negative. PAST MEDICAL HISTORY         Diagnosis Date    Breast mass, right     H/O abnormal Pap smear     had LEEP    Lesion of skin of face     forehead    MVP (mitral valve prolapse)     Psoriasis      None otherwise stated in nurses notes    SURGICAL HISTORY           Procedure Laterality Date    BREAST SURGERY Right 3-25-15    BIOPSY W/ NEEDLE LOCALIZATION.     LEEP      SKIN BIOPSY Right 3-25-15    FOREHEAD SKIN LESION    TONSILLECTOMY       None

## 2020-03-10 NOTE — ED PROVIDER NOTES
eMERGENCY dEPARTMENT eNCOUnter   [address] Name: Diandra Simmons  MRN: [id number]  [place] 1950  Date of evaluation: 3/10/20     Diandra Simmons is a 77 y.o. female with CC: Back Pain      Based on the medical record the care appears appropriate. I was personally available for consultation in the Emergency Department.     Sumeet Rico MD  Attending Emergency Physician                    Lonny Barakat MD  03/10/20 [id number]

## 2020-03-10 NOTE — PROGRESS NOTES
Physical Therapy  Daily Treatment Note  Date: 3/9/2020  Patient Name: Panda Cobb  MRN: [id number]     :   1950    Subjective:   General  Referring Practitioner: Anibal Arreguin   PT Visit Information  Onset Date: 11/15/18  PT Insurance Information: [employer]  Total # of Visits Approved: 24  Total # of Visits to Date:   Plan of Care/Certification Expiration Date: 20  Progress Note Counter:   Subjective  Subjective: No change in complaint of lower back pain. Pain Screening  Patient Currently in Pain: Yes  Pain Assessment  Pain Assessment: 0-10  Pain Level: 9  Patient's Stated Pain Goal: No pain  Pain Type: Chronic pain  Pain Location: Back  Pain Orientation: Lower;Mid(lower back pain )  Pain Descriptors: Sharp  Pain Frequency: Continuous  Pain Onset: Progressive  Clinical Progression: Not changed  Functional Pain Assessment: Prevents or interferes some active activities and ADLs  Non-Pharmaceutical Pain Intervention(s): Ambulation/Increased Activity; Rest;Shower  Response to Pain Intervention: None  Multiple Pain Sites: Yes  Vital Signs  Patient Currently in Pain: Yes  Patient Observation  Observations: Limited mobility due to mid back pain. Treatment Activities:      Bed mobility  Bridging: Independent  Transfers  Sit to Stand: Independent        Ambulation  Ambulation?: Yes  Ambulation 1  Surface: uneven;carpet;outdoors;level tile  Device: No Device  Assistance: Independent  Quality of Gait: Fair   Gait Deviations: Slow Parul;Deviated path  Distance: 450 ft   Comments: limited ability to walk due to mid back pain   Stairs/Curb  Stairs?: Yes  Stairs  # Steps : 5  Stairs Height: 6\"  Rails: Bilateral  Device: No Device; Single pt cane  Assistance: Independent  Comment: Limited ability to ascend and descend steps due to mid back pain      Balance  Posture: Poor  Sitting - Static: Good  Sitting - Dynamic: Good  Standing - Static: Good  Standing - Dynamic: Fair  Single Leg Stance R Leg: 0  Single Leg Stance L Le  AROM RUE (degrees)  RUE AROM : WFL  AROM LUE (degrees)  LUE AROM : WFL  Spine  Cervical: 50 % limited AROM in all directions   Thoracic: painful and limited extension   Lumbar: Not tested   Strength RUE  Strength RUE: WFL  Comment: mildly painful mid back   Strength LUE  Strength LUE: WFL  Comment: mildly painful mid back     Exercises  Exercise 1: HEP - postural changes -in Sitting with \"V\" pillow to lesson kyphosis and position head to neutral posture   Exercise 2: HEP - Sitting (B) UE - ER sitting in chair with \"V\" pillow 15 x   Exercise 3: HEP Avoid poor head posture - down and rotated right. Exercise 4: HEP Neck extension with towel 2x10 reps 25%  Exercise 5: Improved rising  Exercise 6: Avoid lifting, bending, prolonged standing. Exercise 7: sit with a roll 100% of the time  Exercise 10: Doorway strech 6 x 30 sec hold   Exercise 11: PREP - lower back prone laying 1-2 min -- LEONORA 1-2 min -- prone press ups 10 x 2 -- prone laying 1-2 min   Exercise 12: HEP-- Prone laying 2 min   Exercise 13: HEP-- LEONORA 2 min   Exercise 14: HEP-- Prone press ups 10 x 2   Exercise 15: T bands red 3 row,pull downs, ER 15 x       Ortho Screen  Posture: poor       Assessment:   Conditions Requiring Skilled Therapeutic Intervention  Body structures, Functions, Activity limitations: Decreased functional mobility ; Decreased ADL status; Decreased ROM; Decreased strength;Decreased posture; Increased pain;Decreased balance;Decreased high-level IADLs  Treatment Diagnosis: Mid back pain, lower back pain  Prognosis: Good  Barriers to Learning: Pain   REQUIRES PT FOLLOW UP: Yes  Treatment Initiated : HEP   Discharge Recommendations: Home independently  Activity Tolerance  Activity Tolerance: Patient Tolerated treatment well;Patient limited by pain     Goals:  Short term goals  Time Frame for Short term goals: 2 weeks   Short term goal 1: OPTIMAL score of 12/3 at her evaluation, her goal is 9/3. (MET)    Long term goals  Time Frame for Long term goals : 6 weeks   Long term goal 1: OPTIMAL score of 6/3 at the time of her discharge. (NOT MET)  Long term goal 2: Independent with her home program. (MET)  Long term goal 4: PREP-  in prone laying, LEONORA and press ups daily.(MET)    Patient Goals   Patient goals : Walk and move around without pain (MET)    Plan:     Continue with treatment of her lower back one more visit and re-evaluate for continuation of treatment.   Timed Code Treatment Minutes: 30 Minutes  Total Treatment Time: 45  Frequency and duration of TX  Days: 2  Weeks: 6     Therapy Time   Individual Concurrent Group Co-treatment   Time In  1200         Time Out  1245         Minutes  45 min   Total         Timed Code Treatment Minutes: 30 Minutes      Treatment Charges: Minutes Units   []  Ultrasound     []  Electrical-Stim     []  Iontophoresis     []  Traction     []  Massage       []  Eval     []  Gait     [x]  Ther Exercise 30  2    []  Manual Therapy       []  Ther Activities       []  Aquatics     []  Vasopneumatic Device     []  Neuro Re-Ed       [x]  Other  -- HP -- N/C  15 0    Total Treatment Time: 30 2       Angelica Mcdonald, PT

## 2020-03-16 ENCOUNTER — HOSPITAL ENCOUNTER (OUTPATIENT)
Dept: PAIN MANAGEMENT | Age: 70
Discharge: HOME | End: 2020-03-16
Payer: MEDICARE

## 2020-03-16 VITALS
RESPIRATION RATE: 16 BRPM | HEART RATE: 115 BPM | DIASTOLIC BLOOD PRESSURE: 68 MMHG | WEIGHT: 110 LBS | OXYGEN SATURATION: 97 % | TEMPERATURE: 98.4 F | BODY MASS INDEX: 21.6 KG/M2 | HEIGHT: 60 IN | SYSTOLIC BLOOD PRESSURE: 123 MMHG

## 2020-03-16 PROCEDURE — 80307 DRUG TEST PRSMV CHEM ANLYZR: CPT

## 2020-03-16 PROCEDURE — 99244 OFF/OP CNSLTJ NEW/EST MOD 40: CPT | Performed by: PAIN MEDICINE

## 2020-03-16 PROCEDURE — 99203 OFFICE O/P NEW LOW 30 MIN: CPT

## 2020-03-16 RX ORDER — COVID-19 ANTIGEN TEST
220 KIT MISCELLANEOUS 2 TIMES DAILY WITH MEALS
COMMUNITY

## 2020-03-16 RX ORDER — MECLIZINE HCL 25 MG/1
TABLET, CHEWABLE ORAL
COMMUNITY
Start: 2020-02-11

## 2020-03-16 RX ORDER — ERGOCALCIFEROL 1.25 MG/1
CAPSULE ORAL
COMMUNITY
Start: 2020-02-27

## 2020-03-16 RX ORDER — CHOLECALCIFEROL (VITAMIN D3) 25 MCG
TABLET,CHEWABLE ORAL
COMMUNITY
Start: 2020-01-06

## 2020-03-16 RX ORDER — BENZONATATE 200 MG/1
CAPSULE ORAL
COMMUNITY
Start: 2020-01-06

## 2020-03-16 RX ORDER — DIPHENHYDRAMINE HCL 25 MG
TABLET,DISINTEGRATING ORAL
COMMUNITY
Start: 2020-01-06

## 2020-03-16 RX ORDER — CERAMIDES 1,3,6-II
CREAM (GRAM) TOPICAL NIGHTLY
COMMUNITY
Start: 2019-04-09

## 2020-03-16 RX ORDER — IPRATROPIUM BROMIDE AND ALBUTEROL SULFATE 2.5; .5 MG/3ML; MG/3ML
SOLUTION RESPIRATORY (INHALATION)
COMMUNITY
Start: 2020-03-02

## 2020-03-16 RX ORDER — ALBUTEROL SULFATE 90 UG/1
AEROSOL, METERED RESPIRATORY (INHALATION)
COMMUNITY
Start: 2020-03-08

## 2020-03-16 RX ORDER — BUDESONIDE AND FORMOTEROL FUMARATE DIHYDRATE 160; 4.5 UG/1; UG/1
AEROSOL RESPIRATORY (INHALATION)
COMMUNITY
Start: 2020-03-05

## 2020-03-16 ASSESSMENT — ENCOUNTER SYMPTOMS
RHINORRHEA: 1
ABDOMINAL PAIN: 0
BACK PAIN: 1
ALLERGIC/IMMUNOLOGIC NEGATIVE: 1
SHORTNESS OF BREATH: 1
COUGH: 1
EYES NEGATIVE: 1
GASTROINTESTINAL NEGATIVE: 1
NAUSEA: 0
COLOR CHANGE: 0
VOMITING: 0
CONSTIPATION: 0
PHOTOPHOBIA: 0

## 2020-03-16 ASSESSMENT — PAIN DESCRIPTION - PROGRESSION: CLINICAL_PROGRESSION: GRADUALLY WORSENING

## 2020-03-16 ASSESSMENT — PAIN DESCRIPTION - ONSET: ONSET: ON-GOING

## 2020-03-16 ASSESSMENT — PAIN DESCRIPTION - PAIN TYPE: TYPE: CHRONIC PAIN

## 2020-03-16 ASSESSMENT — PAIN DESCRIPTION - DESCRIPTORS: DESCRIPTORS: CONSTANT;SHARP;TINGLING

## 2020-03-16 ASSESSMENT — PAIN DESCRIPTION - LOCATION: LOCATION: BACK;HIP;LEG

## 2020-03-16 ASSESSMENT — PAIN SCALES - GENERAL: PAINLEVEL_OUTOF10: 7

## 2020-03-16 ASSESSMENT — PAIN DESCRIPTION - FREQUENCY: FREQUENCY: CONTINUOUS

## 2020-03-16 ASSESSMENT — PAIN DESCRIPTION - ORIENTATION: ORIENTATION: RIGHT;LEFT

## 2020-03-16 NOTE — PROGRESS NOTES
[facility]  Patient Pain Assessment  Consultation - Dr. Mira Mcintyre    Primary Care Physician: MORRO Sanchez    Chief complaint:   Chief Complaint   Patient presents with    Back Pain   . HISTORY OF PRESENT ILLNESS:  Laisha Covarrubias is 77 y.o. female referred to the pain clinic in consultation for back pain by Dr. Curly Lombard    Patient is a 52-year-old female referred to the pain clinic with a chief complaint of pain involving the low back since November. Patient denies any injuries. The pain is gradually getting worse. She tried physical therapy which made the pain worse. Patient was evaluated by Dr. Artemio Teresa who referred the patient to the pain clinic for consideration of epidural steroid injection. Patient pain is decreased to a certain extent when she lies down. Back Pain   This is a chronic problem. The current episode started more than 1 month ago. The problem occurs intermittently. The problem is unchanged. The pain is present in the lumbar spine and sacro-iliac. Quality: sharp. The pain radiates to the left foot and right foot. The pain is at a severity of 7/10. The pain is severe. The pain is worse during the day. The symptoms are aggravated by bending, position, standing, coughing and sitting (walking, lifting, ADLs). Associated symptoms include headaches and weakness. Pertinent negatives include no abdominal pain, chest pain or dysuria. Risk factors include lack of exercise and sedentary lifestyle (Smoking). RX Monitoring 3/16/2020   Periodic Controlled Substance Monitoring Random urine drug screen sent today. ;Assessed functional status.        Current Pain Assessment  Pain Assessment  Pain Assessment: 0-10  Pain Level: 7  Patient's Stated Pain Goal: 2(Decrease pain and increase activity)  Pain Type: Chronic pain  Pain Location: Back, Hip, Leg  Pain Orientation: Right, Left  Pain Radiating Towards: both legs occasionally  Pain Descriptors: Constant, Sharp, Tingling  Pain Frequency: Continuous  Pain Onset: On-going  Clinical Progression: Gradually worsening  Non-Pharmaceutical Pain Intervention(s): Rest, Repositioned                    ADVERSE MEDICATION EFFECTS:   Constipation: no  Bowel Regimen: No:   Diet: common adult  Appetite:  Eats 1 meal a day  Sedation:  not applicable  Urinary Retention: not applicable    FOCUSED PAIN SCALE:  Highest : 9  Lowest :5  Average: Range-7  When and What  was your last procedure:      Was your procedureeffective:  not applicable    ACTIVITY/SOCIAL/EMOTIONAL:  Sleep Pattern: 2 hours per night.nightime awakenings and difficulty falling back asleep if awakened  Energy Level: Tired/Fatigued  Currently attending Physical Therapy:  No - made pain worse  Home Exercises: stretches every other day  Mobility: Increased pain with walking or standing   Do you use assistive devices? Yes cane for long distances  Have you fallen in the last 30 days?:  Yes  Currently seeing a Psychiatristor Psychologist:  No  Emotional Issues: normal   Mood: appropriate     ABERRANT BEHAVIORS SINCE LAST VISIT:  Have you ever been treated in another Pain Clinic no  Refills for prescriptions appropriate: not applicable  Lost rx/pills: not applicable  Taking more medication than prescribed:  not applicable  Are you receiving PAIN medications from  other doctors: no  Last Urine/Serum Drug Screen :  Was Serum/UDS as anticipated? Will collect  Brought pill bottles in :not applicable   Was Pill count appropriate? :not applicable   Are currently pregnant? no  Recent ER visits: Yes for back pain             Past Medical History      Diagnosis Date    Breast mass, right     H/O abnormal Pap smear     had LEEP    Lesion of skin of face     forehead    MVP (mitral valve prolapse)     Psoriasis        Surgical History  Past Surgical History:   Procedure Laterality Date    BREAST SURGERY Right 3-25-15    BIOPSY W/ NEEDLE LOCALIZATION.     LEEP      SKIN BIOPSY activity: None   Lifestyle    Physical activity     Days per week: None     Minutes per session: None    Stress: None   Relationships    Social connections     Talks on phone: None     Gets together: None     Attends Shinto service: None     Active member of club or organization: None     Attends meetings of clubs or organizations: None     Relationship status: None    Intimate partner violence     Fear of current or ex partner: None     Emotionally abused: None     Physically abused: None     Forced sexual activity: None   Other Topics Concern    None   Social History Narrative    None      reports no history of drug use. REVIEW OF SYSTEMS:  Review of Systems   Constitutional: Negative. Negative for activity change, appetite change and fatigue. HENT: Positive for rhinorrhea. Negative for dental problem, hearing loss and mouth sores. Eyes: Negative. Negative for photophobia and visual disturbance. Respiratory: Positive for cough and shortness of breath. History of severe COPD   Cardiovascular: Negative. Negative for chest pain and palpitations. Gastrointestinal: Negative. Negative for abdominal pain, constipation, nausea and vomiting. Endocrine: Negative. Negative for cold intolerance, heat intolerance, polydipsia and polyuria. Genitourinary: Negative. Negative for dysuria and hematuria. Musculoskeletal: Positive for arthralgias and back pain. Skin: Negative. Negative for color change and pallor. Allergic/Immunologic: Negative. Neurological: Positive for facial asymmetry, weakness and headaches. Hematological: Negative. Does not bruise/bleed easily. Psychiatric/Behavioral: Positive for sleep disturbance. Negative for self-injury and suicidal ideas. The patient is not nervous/anxious.              GENERAL PHYSICAL EXAM:  Vitals: /68   Pulse 115   Temp 98.4 °F (36.9 °C) (Oral)   Resp 16   Ht 5' (1.524 m)   Wt 110 lb (49.9 kg)   SpO2 97%   BMI 21.48 kg/m² , Body mass index is 21.48 kg/m². Physical Exam  Constitutional:       Appearance: Normal appearance. She is well-developed. Comments: Disheveled   HENT:      Head: Normocephalic and atraumatic. Mouth/Throat:      Mouth: Mucous membranes are moist.   Eyes:      Extraocular Movements: Extraocular movements intact. Conjunctiva/sclera: Conjunctivae normal.      Pupils: Pupils are equal, round, and reactive to light. Neck:      Musculoskeletal: Normal range of motion and neck supple. Thyroid: No thyromegaly. Trachea: No tracheal deviation. Cardiovascular:      Rate and Rhythm: Normal rate and regular rhythm. Pulmonary:      Effort: Pulmonary effort is normal.      Breath sounds: Wheezing present. Abdominal:      General: Abdomen is flat. Bowel sounds are normal. There is no distension. Tenderness: There is no abdominal tenderness. Musculoskeletal:      Right lower leg: No edema. Left lower leg: No edema. Skin:     General: Skin is warm and dry. Neurological:      General: No focal deficit present. Mental Status: She is alert and oriented to person, place, and time. Cranial Nerves: Cranial nerves are intact. No cranial nerve deficit. Sensory: Sensation is intact. No sensory deficit. Motor: Motor function is intact. No tremor or atrophy. Coordination: Coordination normal.      Gait: Gait abnormal.      Deep Tendon Reflexes: Reflexes normal.      Reflex Scores:       Patellar reflexes are 1+ on the right side and 1+ on the left side. Achilles reflexes are 1+ on the right side and 1+ on the left side. Psychiatric:         Mood and Affect: Mood normal.         Speech: Speech normal.         Behavior: Behavior normal.         Thought Content:  Thought content normal.         Cognition and Memory: Cognition and memory normal.         Judgment: Judgment normal.      Right Ankle Exam     Muscle Strength   The patient has normal right ankle strength. Left Ankle Exam     Muscle Strength   The patient has normal left ankle strength. Right Knee Exam     Muscle Strength   The patient has normal right knee strength. Left Knee Exam     Muscle Strength   The patient has normal left knee strength. Right Hip Exam     Muscle Strength   The patient has normal right hip strength. Left Hip Exam     Muscle Strength   The patient has normal left hip strength. Back Exam     Tenderness   The patient is experiencing tenderness in the lumbar and sacroiliac. Range of Motion   Back extension: Limited and painful. Back flexion: Limited and painful. Back lateral bend right: Limited and painful. Back lateral bend left: Limited and painful. Back rotation right: Limited and painful. Back rotation left: Limited and painful. Tests   Straight leg raise right: positive  Straight leg raise left: positive    Other   Sensation: normal  Gait: antalgic   Erythema: no back redness  Scars: absent    Comments:  Skin --normal appearance  Mild thoracic kyphosis present and scoliosis absent  Alignment of her shoulders, scapulae and iliac crests--symmetric  Paraspinal tenderness:Present, paraspinal muscle spasm present  Lumbar lordosis-----------Decreased  Movements of the lumbar spine indicated above are diminished range with pain   Facet loading---  : Right side--    Pain-Moderate                                   Left side----   Pain  Moderate            Nurses Notes and Vital Signs reviewed.     DATA  Labs:   7/26/2019 10:10 AM - Eliza Stokes Incoming Lab Results From [facility]     Component Value Ref Range & Units Status Collected Lab   Glucose 127High   70 - 99 mg/dL Final 07/26/2019  9:13 AM [facility]   BUN 11  8 - 23 mg/dL Final 07/26/2019  9:13 AM [facility]   CREATININE 0.52  0.50 - 0.90 mg/dL Final 07/26/2019  9:13 AM [facility]   Bun/Cre Ratio NOT REPORTED  9 - 20 Final PROVIDED HISTORY:   Reason for Exam: Acute low back pain - unspecified back pain laterality   Acuity: Unknown   Type of Exam: Unknown   Additional signs and symptoms: Patient c/o Lower back pain with   numbness/tingling to left leg. No known injury. Relevant Medical/Surgical History: No relevant surgical hx to area of   interest. Hx - Skin CA and back pain.       FINDINGS:   BONES/ALIGNMENT: There is normal alignment of the spine. The vertebral body   heights are maintained. The bone marrow signal lumbar spine appears   unremarkable.  There is heterogeneously increased marrow signal identified   involving S3-S4 and involving the overlying sacral soft tissues of uncertain   clinical significance.  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       SPINAL CORD: The conus terminates normally at L1-L2.       SOFT TISSUES: No paraspinal mass identified.       L1-L2: There is moderate degenerative disc space disease identified with   circumferential disc bulge.  Small right foraminal disc extrusion noted. Moderate narrowing right neural foramen.  Left foramen is patent.  Please   correlate with possible right L1 radiculopathy.  Moderate to severe central   canal stenosis identified due to congenital short pedicles and degenerative   disc space disease.       L2-L3: Moderate degenerative disc space disease identified with moderate   cervical disc bulge.  Moderate broad-based central disc protrusion   identified.  This causes moderate central canal stenosis with loss of the   normal T2 signal along the nerve roots of cauda equina.  Slight redundancy of   nerve roots of cauda equina identified.  Moderate bilateral neural foraminal   narrowing.       L3-L4: Minimal degenerative loss of disc space height and signal.  Mild   neural foraminal narrowing.  Mild facet arthropathy.  No focal disc   protrusion.  No central canal stenosis.  the cauda equina are displaced   towards the left of uncertain clinical significance, no obvious include    [] Bone scan   [] EMG and nerve conduction studies   [x] Referral reports-  I also discussed with him the following treatment options Including advantages and disadvantages of each:    [] Physical therapy    [] Interventional pain treatment    [] Medication management    [] Surgical options    Patient's OARRS were reviewed. It is acceptable and appears patient is not receiving prescriptions from multiple prescribers. Patient is  forthcoming regarding prescriptions for pain medication in the past  Controlled Substances Monitoring: Periodic Controlled Substance Monitoring: Random urine drug screen sent today., Assessed functional status. (Maximo Mcknight MD)    The following screens were also reviewed  SOAPP- the score is 7. (Values:cates patient is  <4minimal potential  4-7 Moderate potential  >7 High potential  for drug addiction  Counselling/Preventive measures for pain  Control:    [x]  Spine strengthening exercises are discussed with patient in detail. Patient is counseled on importance of exercise and,core strengthening. Some  specific exercises to strengthen the abdominal muscles and low back muscles Were discussed. Also aquatic (water) physical therapy and benefits were explained to patient. including \" Water supports the body and minimizes the effect of gravity, making it easier for patients to start an exercise program.\"   The following important principles were discussed with patient:  1. Limit Bed Rest--Studies show that people with short-term low-back pain who rest feel more pain and have a harder time with daily tasks than those who stay active. 2. Keep Exercising-patient is advised to stay away from strenuous activities like gardening and avoid whatever motion caused the pain in the first place.   3. Maintain Good Posture-Exercises  to maintain good posture were shown to patient. 4. To do exercises learned in PT regularly.   [x]Information (handout) on exercise was  given to

## 2020-03-19 LAB
6-ACETYLMORPHINE, UR: NOT DETECTED
7-AMINOCLONAZEPAM, URINE: NOT DETECTED
ALPHA-OH-ALPRAZ, URINE: NOT DETECTED
ALPRAZOLAM, URINE: NOT DETECTED
AMPHETAMINES, URINE: NOT DETECTED
BARBITURATES, URINE: NOT DETECTED
BENZOYLECGONINE, UR: NOT DETECTED
BUPRENORPHINE URINE: NOT DETECTED
CARISOPRODOL, UR: NOT DETECTED
CLONAZEPAM, URINE: NOT DETECTED
CODEINE, URINE: NOT DETECTED
CREATININE URINE: 157.9 MG/DL (ref 20–400)
DIAZEPAM, URINE: NOT DETECTED
DRUGS EXPECTED, UR: NORMAL
EER HI RES INTERP UR: NORMAL
ETHYL GLUCURONIDE UR: PRESENT
FENTANYL URINE: NOT DETECTED
HYDROCODONE, URINE: NOT DETECTED
HYDROMORPHONE, URINE: NOT DETECTED
LORAZEPAM, URINE: NOT DETECTED
MARIJUANA METAB, UR: NOT DETECTED
MDA, UR: NOT DETECTED
MDEA, EVE, UR: NOT DETECTED
MDMA URINE: NOT DETECTED
MEPERIDINE METAB, UR: NOT DETECTED
METHADONE, URINE: NOT DETECTED
METHAMPHETAMINE, URINE: NOT DETECTED
METHYLPHENIDATE: NOT DETECTED
MIDAZOLAM, URINE: NOT DETECTED
MORPHINE URINE: NOT DETECTED
NORBUPRENORPHINE, URINE: NOT DETECTED
NORDIAZEPAM, URINE: NOT DETECTED
NORFENTANYL, URINE: NOT DETECTED
NORHYDROCODONE, URINE: NOT DETECTED
NOROXYCODONE, URINE: NOT DETECTED
NOROXYMORPHONE, URINE: NOT DETECTED
OXAZEPAM, URINE: NOT DETECTED
OXYCODONE URINE: NOT DETECTED
OXYMORPHONE, URINE: NOT DETECTED
PAIN MANAGEMENT DRUG PANEL INTERP, URINE: NORMAL
PAIN MGT DRUG PANEL, HI RES, UR: NORMAL
PCP,URINE: NOT DETECTED
PHENTERMINE, UR: NOT DETECTED
PROPOXYPHENE, URINE: NOT DETECTED
TAPENTADOL, URINE: NOT DETECTED
TAPENTADOL-O-SULFATE, URINE: NOT DETECTED
TEMAZEPAM, URINE: NOT DETECTED
TRAMADOL, URINE: NOT DETECTED
ZOLPIDEM, URINE: NOT DETECTED

## 2020-04-01 RX ORDER — NAPROXEN 500 MG/1
TABLET ORAL
Qty: 60 TABLET | Refills: 0 | Status: SHIPPED | OUTPATIENT
Start: 2020-04-01

## 2020-05-04 NOTE — PROGRESS NOTES
Physical Therapy  Discharge Note  Date: 2020  Patient Name: Selina España  MRN: [id number]     :   1950    General  Referring Practitioner: Subha Lyons   PT Visit Information  Onset Date: 11/15/18  PT Insurance Information: [employer]  Total # of Visits Approved: 24  Total # of Visits to Date:   Plan of Care/Certification Expiration Date: 20  Progress Note Counter:   General Comment  Comments: Discharge PT, due to COVID-19.            Austin Novoa, PT

## 2020-05-19 ENCOUNTER — OFFICE VISIT (OUTPATIENT)
Dept: ORTHOPEDIC SURGERY | Age: 70
End: 2020-05-19
Payer: MEDICARE

## 2020-05-19 PROCEDURE — 4004F PT TOBACCO SCREEN RCVD TLK: CPT | Performed by: ORTHOPAEDIC SURGERY

## 2020-05-19 PROCEDURE — G8427 DOCREV CUR MEDS BY ELIG CLIN: HCPCS | Performed by: ORTHOPAEDIC SURGERY

## 2020-05-19 PROCEDURE — 3017F COLORECTAL CA SCREEN DOC REV: CPT | Performed by: ORTHOPAEDIC SURGERY

## 2020-05-19 PROCEDURE — G8399 PT W/DXA RESULTS DOCUMENT: HCPCS | Performed by: ORTHOPAEDIC SURGERY

## 2020-05-19 PROCEDURE — 1123F ACP DISCUSS/DSCN MKR DOCD: CPT | Performed by: ORTHOPAEDIC SURGERY

## 2020-05-19 PROCEDURE — G8420 CALC BMI NORM PARAMETERS: HCPCS | Performed by: ORTHOPAEDIC SURGERY

## 2020-05-19 PROCEDURE — 4040F PNEUMOC VAC/ADMIN/RCVD: CPT | Performed by: ORTHOPAEDIC SURGERY

## 2020-05-19 PROCEDURE — 1090F PRES/ABSN URINE INCON ASSESS: CPT | Performed by: ORTHOPAEDIC SURGERY

## 2020-05-19 PROCEDURE — 99213 OFFICE O/P EST LOW 20 MIN: CPT | Performed by: ORTHOPAEDIC SURGERY

## 2020-06-03 ASSESSMENT — ENCOUNTER SYMPTOMS
COUGH: 1
RHINORRHEA: 1
ALLERGIC/IMMUNOLOGIC NEGATIVE: 1
CONSTIPATION: 0
COLOR CHANGE: 0
PHOTOPHOBIA: 0
ABDOMINAL PAIN: 0
BACK PAIN: 1
EYES NEGATIVE: 1
SHORTNESS OF BREATH: 1
VOMITING: 0
NAUSEA: 0
GASTROINTESTINAL NEGATIVE: 1

## 2020-06-03 NOTE — PROGRESS NOTES
Elwin Aase is a 77 y.o. female evaluated on 6/4/2020. Modality of virtual service provided -via  telephone   Consent:  Patient and/or health care decision maker is aware that that patient may receive a bill for this telephone service, depending on one's insurance coverage, and has provided verbal consent to proceed: Yes    Patient identification was verified at the start of the visit: Yes    Chief complaint: Elwin Aase is 77 y.o.,  female, with  No chief complaint on file. .    Patient is a 70-year-old female with a chief complaint of pain involving the low back area with pain radiating to both lower extremities. Patient reports she is unable to walk because of the pain. Patient reports any change in her pain since her last evaluation. She was seen by  who referred her for possible lumbar epidural steroid injection. Back Pain   This is a chronic problem. The current episode started more than 1 month ago. The problem occurs rarely. The problem is unchanged. The pain is present in the lumbar spine and sacro-iliac. Quality: sharp. The pain radiates to the left foot and right foot. The pain is at a severity of 8/10 (5-10). The pain is severe. The pain is worse during the day. The symptoms are aggravated by bending, position, standing, coughing and sitting (walking, lifting, ADLs). Associated symptoms include headaches and weakness. Pertinent negatives include no abdominal pain, chest pain or dysuria. Risk factors include lack of exercise and sedentary lifestyle (Smoking). Alleviating factors:nothing   Lifestyle changes experienced with pain: Wakes from sleep, Prevents or limits ADLs, Increases w/activity. , Increases w/prolonged sitting/standing/walking  Mood changes,none  Patient currently unemployed. Physical therapy did not help the pain.     Are you under psychological counseling at present: No  Goals for treatment include:  Decrease in pain  Enjoy daily and recreational activities, return to previous status. Last procedure was      Patient relates current medications are helping the pain. Patient reports taking pain medications as prescribed, denies obtaining medications from different sources and denies use of illegal drugs. Patient denies side effects from medications like nausea, vomiting, constipation or drowsiness. Patient reports current activities of daily living ar possible due to medications and would like to continue them. ADVERSE MEDICATION EFFECTS:   Nausea and vomiting: no   Constipation: no-Undercontrol-: yes  Dizziness/drowsy/sleepy--no  Urinary Retention: no    ACTIVITY/SOCIAL/EMOTIONAL:  Sleep Pattern: 7 hours per night. nightime awakenings and difficulty falling back asleep if awakened  Home Exercises: - none  Activity:not significantly changed  Emotional Issues: normal.   Currently seeing a Psychiatrist or Psychologist:  No      ABERRANT BEHAVIORS SINCE LAST VISIT  Lost rx/pills:------------------------------------------ not applicable  Taking  medication as prescribed: ----------- not applicable  Urine Drug Screen ---------------------------------  not applicable  Recent ER visits: -------------------------------------No  Pill count is appropriate: ---------------------------not applicable   Refills for prescriptions appropriate:---------- not applicable      Past Medical History:   Diagnosis Date    Breast mass, right     H/O abnormal Pap smear     had LEEP    Lesion of skin of face     forehead    MVP (mitral valve prolapse)     Psoriasis        Past Surgical History:   Procedure Laterality Date    BREAST SURGERY Right 3-25-15    BIOPSY W/ NEEDLE LOCALIZATION.  LEEP      SKIN BIOPSY Right 3-25-15    FOREHEAD SKIN LESION    TONSILLECTOMY         No family history on file.     Social History     Socioeconomic History    Marital status:      Spouse name: Not on file    Number of children: Not on file    Years of Cyanocobalamin (B-12) 1000 MCG TBCR TK 2 TS PO D      vitamin D (ERGOCALCIFEROL) 1.25 MG (92740 UT) CAPS capsule TK 1 C PO 1 TIME A WK      ipratropium-albuterol (DUONEB) 0.5-2.5 (3) MG/3ML SOLN nebulizer solution U 3 ML VIA NEB Q 6 H PRN      TRAVEL SICKNESS 25 MG CHEW CSW 1 T PO BID PRN      Naproxen Sodium 220 MG CAPS Take 220 mg by mouth 2 times daily (with meals)       No current facility-administered medications on file prior to encounter. Review of Systems   Constitutional: Negative. Negative for activity change, appetite change and fatigue. HENT: Positive for rhinorrhea. Negative for dental problem, hearing loss and mouth sores. Eyes: Negative. Negative for photophobia and visual disturbance. Respiratory: Positive for cough and shortness of breath. History of severe COPD   Cardiovascular: Negative. Negative for chest pain and palpitations. Gastrointestinal: Negative. Negative for abdominal pain, constipation, nausea and vomiting. Endocrine: Negative. Negative for cold intolerance, heat intolerance, polydipsia and polyuria. Genitourinary: Negative. Negative for dysuria and hematuria. Musculoskeletal: Positive for arthralgias and back pain. Skin: Negative. Negative for color change and pallor. Allergic/Immunologic: Negative. Neurological: Positive for facial asymmetry, weakness and headaches. Hematological: Negative. Does not bruise/bleed easily. Psychiatric/Behavioral: Positive for sleep disturbance. Negative for self-injury and suicidal ideas. The patient is not nervous/anxious.          Physical Exam       DATA:  LAB.:  3/19/2020  9:46 PM - [facility] Incoming Lab Results From [facility]     Component Value Ref Range & Units Status Collected Lab   Pain Management Drug Panel Interp, Urine See Note   Final 03/16/2020  3:41 PM [facility]   (NOTE)   ________________________________________________________________   DRUGS EXPECTED:   [address] Ca (HYDROCODONE) [3/11/20] narrowing right neural foramen. Left foramen is patent.  Please   correlate with possible right L1 radiculopathy.  Moderate to severe central   canal stenosis identified due to congenital short pedicles and degenerative   disc space disease.       L2-L3: Moderate degenerative disc space disease identified with moderate   cervical disc bulge.  Moderate broad-based central disc protrusion   identified.  This causes moderate central canal stenosis with loss of the   normal T2 signal along the nerve roots of cauda equina.  Slight redundancy of   nerve roots of cauda equina identified.  Moderate bilateral neural foraminal   narrowing.       L3-L4: Minimal degenerative loss of disc space height and signal.  Mild   neural foraminal narrowing.  Mild facet arthropathy.  No focal disc   protrusion.  No central canal stenosis.  the cauda equina are displaced   towards the left of uncertain clinical significance, no obvious adhesion or   convincing evidence of CSF intensity extramedullary mass on right. Mild-to-moderate facet arthropathy.       L4-L5: Moderate degenerative loss of disc space height and signal.  Slight   increased T2 signal identified likely degenerative.  There is a right   paracentral disc protrusion with corresponding increased T2 signal right   lateral recess.  This partially effaces the right lateral recess and affects   the traversing right L5 nerve root.  Mild neural foraminal narrowing.    Mild-to-moderate facet arthropathy.  Mild-to-moderate ligamentum flavum   hypertrophy.       L5-S1: Mild-to-moderate degenerative disc disease identified with   circumferential disc bulge.  Moderate degenerative facet arthropathy.  Subtle   spurs identified bilaterally most pronounced on the left.  This results in   mild right and moderate left neural foraminal narrowing.  Mild ligament   flavum hypertrophy.           Impression   Moderate multilevel degenerative changes with moderate to moderate-severe   canal stenosis L1 through L3 due to combination of congenital short pedicles   and disc space disease.       Right paracentral disc protrusion L4-L5.  Please correlate with possible L5   radiculopathy.       Findings suspicious for a right foraminal disc protrusion at L1-L2. Clinical  impression:  1. Lumbar radiculopathy    2. Lumbosacral spondylosis without myelopathy    3. DDD (degenerative disc disease), lumbar    4. Arthropathy of lumbar facet joint        Plan of care: The following treatment plan was developed after discussion with patient:    We discussed Lumbar Epidural steroid Injections x 1  at L4 - L5. Patient tried and failed NSAIDS,Home exercises, Physical Therapy, Chiropractic manipulations without relief. Patient exhibited signs of radiculopathy with positive straight leg raising test on right    Patient has not had prior Lumbar Surgery. We will see the patient in 2 weeks after the procedures and re-evaluate symptoms. PDMP Monitoring:    Last PDMP Drew Anderson as Reviewed [facility]):  Review User Review Instant Review Result   Raudel Tamez 6/3/2020  6:07 AM Reviewed PDMP [1]     Counselling/Preventive measures for pain  Control:    [x]  Spine strengthening exercises are discussed with patient in detail. [x]  Patient is counseled about  ill effects of smoking for 8 minutes -Patient is strongly urged to quit smoking   Following health benefits were discussed:  People who stop smoking greatly reduce their risk for disease and premature death. Lowered risk for lung cancer and many other types of cancer. Reduced risk for coronary heart disease, stroke, and peripheral vascular disease. Reduced coronary heart disease risk within 1 to 2 years of quitting. Reduced respiratory symptoms, such as coughing, wheezing, and shortness of breath. The rate of decline in lung function is slower among people who quit smoking than among those who continue to smoke.   Reduced risk of developing chronic obstructive pulmonary disease (COPD), one of the leading causes of death in the [place]. Reduced risk for infertility in women of reproductive age. Women who stop smoking during pregnancy also reduce their risk of having a low birth weight baby. Reduced risk of bone and joint damage. Methods to Quit Smoking  The majority of cigarette smokers quit without using evidence-based   Counseling and medication are both effective for treating tobacco dependence, and using them together is more effective than using either one alone. Patient is advised to follow up with his physician for further management. Orders Placed This Encounter   Procedures    Lumbar Epidural Steroid Injection/Caudal     Standing Status:   Future     Standing Expiration Date:   6/4/2021   Amando Roque For Surgical Procedures     Standing Status:   Future     Standing Expiration Date:   6/4/2021    Saline lock IV     Standing Status:   Future     Standing Expiration Date:   12/4/2021       Decision Making Process : Patient's health history and referral records thoroughly reviewed before focused physical examination and discussion with patient. I have spent 25 mins. Over 50% of today's visit is spent on examining the patient and counseling and coordinating the care. Level of complexity of date to be reviewed is Moderate. The chart date reviewed include the following: Imaging Reports. Summary of Care. Time spent reviewing with patient the below reports:   Medication safety, Treatment options. Level of diagnosis and management options of this case is multiple: involving the following management options: Interventions as needed, medication management as appropriate, future visits, activity modification, heat/ice as needed, Urine drug screen as required. [x]The patient's questions were answered to the best of my abilities. This note was created using voice recognition software.  There may be inaccuracies of transcription  that are inadvertently overlooked prior to the signature. There is any questions about the transcription please contact me. Jada Pacific Due to the COVID-19 pandemic and the appropriate interventions by Lay Damon, our non-urgent pain management patients will not be seen in the office at this time for their protection and the protection of our staff. To offer continuity of care, their prescriptions will be escribed this month after a careful chart review and review of their OARRS report  Pursuant to the emergency declaration under the [employer] and [facility], [place], 113 waiver authority and the [employer] and [employer] Act, this Virtual Visit was conducted, with patient's consent, to reduce the patient's risk of exposure to COVID-19 and provide continuity of care for an established patient. Services were provided through a video synchronous discussion virtually to substitute for in-person appointment. \"  Documentation:  I communicated with the patient and/or health care decision maker about plan of care  Details of this discussion including any medical advice provided: Total Time: minutes: 21-30 minutes    I affirm this is a Patient Initiated Episode with an Established Patient who has not had a related appointment within my department in the past 7 days or scheduled within the next 24 hours.     Electronically signed by Wiliam Riddle MD on 6/4/2020 at 10:55 AM

## 2020-06-04 ENCOUNTER — HOSPITAL ENCOUNTER (OUTPATIENT)
Dept: PAIN MANAGEMENT | Age: 70
Discharge: HOME | End: 2020-06-04
Payer: MEDICARE

## 2020-06-04 PROCEDURE — 99214 OFFICE O/P EST MOD 30 MIN: CPT | Performed by: PAIN MEDICINE

## 2020-06-04 PROCEDURE — 99213 OFFICE O/P EST LOW 20 MIN: CPT

## 2020-08-10 ENCOUNTER — TELEPHONE (OUTPATIENT)
Dept: ONCOLOGY | Age: 70
End: 2020-08-10

## 2020-08-10 NOTE — TELEPHONE ENCOUNTER
Our records indicate that patient is due for their annual lung cancer screening follow up testing. Lung Screening Order faxed to provider for completion. Will fax order to scheduling when received. Auto printed reminder letter sent to patient. Aristides Azul

## 2020-10-07 ENCOUNTER — TELEPHONE (OUTPATIENT)
Dept: ONCOLOGY | Age: 70
End: 2020-10-07

## 2020-11-25 ENCOUNTER — TELEPHONE (OUTPATIENT)
Dept: ONCOLOGY | Age: 70
End: 2020-11-25

## 2020-11-25 NOTE — TELEPHONE ENCOUNTER
Our records indicate that patient is due for their annual lung cancer screening follow up testing. Lung Screening Order faxed to provider for completion. Will fax order to scheduling when received. Auto printed reminder letter sent to patient.

## 2021-04-19 NOTE — PROGRESS NOTES
her home program. (MET)  Patient Goals   Patient goals : Walk and move around without pain (MET)    Plan:     Continue PT 2 x a week x 4 weeks for her lower back and cervical areas.    Timed Code Treatment Minutes: 45 Minutes  Total Treatment Time: 60  Frequency and duration of TX  Days: 2  Weeks: 6     Therapy Time   Individual Concurrent Group Co-treatment   Time In  1030         Time Out  1130         Minutes  60 min Total  15 min   N/C RVD  15 min  XRD  15 min   NRD         Timed Code Treatment Minutes: 70 [facility] Blane Garcia, PT Propranolol Counseling:  I discussed with the patient the risks of propranolol including but not limited to low heart rate, low blood pressure, low blood sugar, restlessness and increased cold sensitivity. They should call the office if they experience any of these side effects.

## 2021-04-28 ENCOUNTER — HOSPITAL ENCOUNTER (OUTPATIENT)
Age: 71
Discharge: HOME | End: 2021-04-28
Payer: MEDICARE

## 2021-04-28 ENCOUNTER — HOSPITAL ENCOUNTER (OUTPATIENT)
Dept: GENERAL RADIOLOGY | Age: 71
Discharge: HOME | End: 2021-04-30
Payer: MEDICARE

## 2021-04-28 ENCOUNTER — HOSPITAL ENCOUNTER (OUTPATIENT)
Age: 71
Discharge: HOME | End: 2021-04-30
Payer: MEDICARE

## 2021-04-28 ENCOUNTER — HOSPITAL ENCOUNTER (OUTPATIENT)
Dept: CT IMAGING | Age: 71
Discharge: HOME | End: 2021-04-30
Payer: MEDICARE

## 2021-04-28 DIAGNOSIS — R91.1 NODULE OF RIGHT LUNG: ICD-10-CM

## 2021-04-28 LAB
ABSOLUTE EOS #: 0.2 K/UL (ref 0–0.4)
ABSOLUTE IMMATURE GRANULOCYTE: ABNORMAL K/UL (ref 0–0.3)
ABSOLUTE LYMPH #: 2.6 K/UL (ref 1–4.8)
ABSOLUTE MONO #: 0.8 K/UL (ref 0.1–1.3)
ALBUMIN SERPL-MCNC: 4.7 G/DL (ref 3.5–5.2)
ALBUMIN/GLOBULIN RATIO: ABNORMAL (ref 1–2.5)
ALP BLD-CCNC: 106 U/L (ref 35–104)
ALT SERPL-CCNC: 11 U/L (ref 5–33)
ANION GAP SERPL CALCULATED.3IONS-SCNC: 11 MMOL/L (ref 9–17)
AST SERPL-CCNC: 15 U/L
BASOPHILS # BLD: 1 % (ref 0–2)
BASOPHILS ABSOLUTE: 0.1 K/UL (ref 0–0.2)
BILIRUB SERPL-MCNC: 0.32 MG/DL (ref 0.3–1.2)
BUN BLDV-MCNC: 10 MG/DL (ref 8–23)
BUN/CREAT BLD: ABNORMAL (ref 9–20)
CALCIUM SERPL-MCNC: 9.8 MG/DL (ref 8.6–10.4)
CHLORIDE BLD-SCNC: 102 MMOL/L (ref 98–107)
CO2: 28 MMOL/L (ref 20–31)
CREAT SERPL-MCNC: 0.55 MG/DL (ref 0.5–0.9)
DIFFERENTIAL TYPE: ABNORMAL
EOSINOPHILS RELATIVE PERCENT: 2 % (ref 0–4)
GFR AFRICAN AMERICAN: >60 ML/MIN
GFR NON-AFRICAN AMERICAN: >60 ML/MIN
GFR SERPL CREATININE-BSD FRML MDRD: ABNORMAL ML/MIN/{1.73_M2}
GFR SERPL CREATININE-BSD FRML MDRD: ABNORMAL ML/MIN/{1.73_M2}
GLUCOSE BLD-MCNC: 124 MG/DL (ref 70–99)
HCT VFR BLD CALC: 46.3 % (ref 36–46)
HEMOGLOBIN: 15 G/DL (ref 12–16)
IMMATURE GRANULOCYTES: ABNORMAL %
LYMPHOCYTES # BLD: 34 % (ref 24–44)
MCH RBC QN AUTO: 29.3 PG (ref 26–34)
MCHC RBC AUTO-ENTMCNC: 32.4 G/DL (ref 31–37)
MCV RBC AUTO: 90.5 FL (ref 80–100)
MONOCYTES # BLD: 11 % (ref 1–7)
NRBC AUTOMATED: ABNORMAL PER 100 WBC
PDW BLD-RTO: 15.4 % (ref 11.5–14.9)
PLATELET # BLD: 427 K/UL (ref 150–450)
PLATELET ESTIMATE: ABNORMAL
PMV BLD AUTO: 7.1 FL (ref 6–12)
POTASSIUM SERPL-SCNC: 4.3 MMOL/L (ref 3.7–5.3)
RBC # BLD: 5.11 M/UL (ref 4–5.2)
RBC # BLD: ABNORMAL 10*6/UL
SEG NEUTROPHILS: 52 % (ref 36–66)
SEGMENTED NEUTROPHILS ABSOLUTE COUNT: 3.9 K/UL (ref 1.3–9.1)
SODIUM BLD-SCNC: 141 MMOL/L (ref 135–144)
TOTAL PROTEIN: 7.8 G/DL (ref 6.4–8.3)
TSH SERPL DL<=0.05 MIU/L-ACNC: 1.26 MIU/L (ref 0.3–5)
WBC # BLD: 7.5 K/UL (ref 3.5–11)
WBC # BLD: ABNORMAL 10*3/UL

## 2021-04-28 PROCEDURE — 84443 ASSAY THYROID STIM HORMONE: CPT

## 2021-04-28 PROCEDURE — 36415 COLL VENOUS BLD VENIPUNCTURE: CPT

## 2021-04-28 PROCEDURE — 80053 COMPREHEN METABOLIC PANEL: CPT

## 2021-04-28 PROCEDURE — 85025 COMPLETE CBC W/AUTO DIFF WBC: CPT

## 2021-04-28 PROCEDURE — 71046 X-RAY EXAM CHEST 2 VIEWS: CPT

## 2021-04-28 PROCEDURE — 71250 CT THORAX DX C-: CPT

## 2021-05-13 ENCOUNTER — HOSPITAL ENCOUNTER (OUTPATIENT)
Dept: WOMENS IMAGING | Age: 71
Discharge: HOME | End: 2021-05-15
Payer: MEDICARE

## 2021-05-13 DIAGNOSIS — Z12.31 ENCOUNTER FOR SCREENING MAMMOGRAM FOR BREAST CANCER: ICD-10-CM

## 2021-05-13 DIAGNOSIS — M85.80 OSTEOPENIA, UNSPECIFIED LOCATION: ICD-10-CM

## 2021-05-13 PROCEDURE — 77063 BREAST TOMOSYNTHESIS BI: CPT

## 2021-05-13 PROCEDURE — 77080 DXA BONE DENSITY AXIAL: CPT

## 2021-11-18 ENCOUNTER — HOSPITAL ENCOUNTER (OUTPATIENT)
Age: 71
Discharge: HOME | End: 2021-11-18
Payer: MEDICARE

## 2021-11-18 LAB
CHOLESTEROL/HDL RATIO: 4.6
CHOLESTEROL: 307 MG/DL
ESTIMATED AVERAGE GLUCOSE: 117 MG/DL
HBA1C MFR BLD: 5.7 % (ref 4–6)
HDLC SERPL-MCNC: 67 MG/DL
LDL CHOLESTEROL: 200 MG/DL (ref 0–130)
TRIGL SERPL-MCNC: 199 MG/DL
VITAMIN D 25-HYDROXY: 35.2 NG/ML (ref 30–100)
VLDLC SERPL CALC-MCNC: ABNORMAL MG/DL (ref 1–30)

## 2021-11-18 PROCEDURE — 82306 VITAMIN D 25 HYDROXY: CPT

## 2021-11-18 PROCEDURE — 80061 LIPID PANEL: CPT

## 2021-11-18 PROCEDURE — 83036 HEMOGLOBIN GLYCOSYLATED A1C: CPT

## 2021-11-18 PROCEDURE — 36415 COLL VENOUS BLD VENIPUNCTURE: CPT

## 2021-11-18 PROCEDURE — 82607 VITAMIN B-12: CPT

## 2021-11-19 LAB — VITAMIN B-12: 1002 PG/ML (ref 232–1245)

## 2022-04-11 ENCOUNTER — HOSPITAL ENCOUNTER (OUTPATIENT)
Dept: MRI IMAGING | Age: 72
Discharge: HOME | End: 2022-04-13
Payer: MEDICARE

## 2022-04-11 ENCOUNTER — HOSPITAL ENCOUNTER (OUTPATIENT)
Age: 72
Discharge: HOME | End: 2022-04-11
Payer: MEDICARE

## 2022-04-11 DIAGNOSIS — R51.9 NEW ONSET HEADACHE: ICD-10-CM

## 2022-04-11 LAB — HEPATITIS C ANTIBODY: REACTIVE

## 2022-04-11 PROCEDURE — 86803 HEPATITIS C AB TEST: CPT

## 2022-04-11 PROCEDURE — 36415 COLL VENOUS BLD VENIPUNCTURE: CPT

## 2022-04-11 PROCEDURE — 70551 MRI BRAIN STEM W/O DYE: CPT

## 2022-04-12 ENCOUNTER — TELEPHONE (OUTPATIENT)
Dept: ONCOLOGY | Age: 72
End: 2022-04-12

## 2022-05-27 ENCOUNTER — HOSPITAL ENCOUNTER (EMERGENCY)
Age: 72
Discharge: HOME | End: 2022-05-28
Attending: EMERGENCY MEDICINE
Payer: MEDICARE

## 2022-05-27 DIAGNOSIS — M54.6 PAIN IN THORACIC SPINE: Primary | ICD-10-CM

## 2022-05-27 PROCEDURE — 96372 THER/PROPH/DIAG INJ SC/IM: CPT

## 2022-05-27 PROCEDURE — 99284 EMERGENCY DEPT VISIT MOD MDM: CPT

## 2022-05-27 RX ORDER — ORPHENADRINE CITRATE 30 MG/ML
60 INJECTION INTRAMUSCULAR; INTRAVENOUS ONCE
Status: COMPLETED | OUTPATIENT
Start: 2022-05-28 | End: 2022-05-28

## 2022-05-27 RX ORDER — LIDOCAINE 4 G/G
1 PATCH TOPICAL DAILY
Status: DISCONTINUED | OUTPATIENT
Start: 2022-05-28 | End: 2022-05-28

## 2022-05-27 ASSESSMENT — PAIN DESCRIPTION - LOCATION: LOCATION: BACK

## 2022-05-27 ASSESSMENT — PAIN SCALES - GENERAL: PAINLEVEL_OUTOF10: 10

## 2022-05-27 ASSESSMENT — PAIN - FUNCTIONAL ASSESSMENT: PAIN_FUNCTIONAL_ASSESSMENT: 0-10

## 2022-05-28 ENCOUNTER — APPOINTMENT (OUTPATIENT)
Dept: CT IMAGING | Age: 72
End: 2022-05-28
Payer: MEDICARE

## 2022-05-28 VITALS
OXYGEN SATURATION: 98 % | BODY MASS INDEX: 24.02 KG/M2 | SYSTOLIC BLOOD PRESSURE: 139 MMHG | TEMPERATURE: 97.7 F | DIASTOLIC BLOOD PRESSURE: 67 MMHG | HEART RATE: 89 BPM | WEIGHT: 123 LBS | RESPIRATION RATE: 18 BRPM

## 2022-05-28 PROCEDURE — 72128 CT CHEST SPINE W/O DYE: CPT

## 2022-05-28 PROCEDURE — 6360000002 HC RX W HCPCS: Performed by: STUDENT IN AN ORGANIZED HEALTH CARE EDUCATION/TRAINING PROGRAM

## 2022-05-28 PROCEDURE — 6370000000 HC RX 637 (ALT 250 FOR IP): Performed by: STUDENT IN AN ORGANIZED HEALTH CARE EDUCATION/TRAINING PROGRAM

## 2022-05-28 RX ORDER — LIDOCAINE 4 G/G
1 PATCH TOPICAL DAILY
Qty: 10 PATCH | Refills: 0 | Status: SHIPPED | OUTPATIENT
Start: 2022-05-28 | End: 2022-06-07

## 2022-05-28 RX ORDER — LIDOCAINE 4 G/G
1 PATCH TOPICAL DAILY
Status: DISCONTINUED | OUTPATIENT
Start: 2022-05-28 | End: 2022-05-28 | Stop reason: HOSPADM

## 2022-05-28 RX ORDER — CYCLOBENZAPRINE HCL 5 MG
5 TABLET ORAL 2 TIMES DAILY PRN
Qty: 10 TABLET | Refills: 0 | Status: SHIPPED | OUTPATIENT
Start: 2022-05-28 | End: 2022-06-07

## 2022-05-28 RX ORDER — KETOROLAC TROMETHAMINE 15 MG/ML
15 INJECTION, SOLUTION INTRAMUSCULAR; INTRAVENOUS ONCE
Status: COMPLETED | OUTPATIENT
Start: 2022-05-28 | End: 2022-05-28

## 2022-05-28 RX ADMIN — KETOROLAC TROMETHAMINE 15 MG: 15 INJECTION, SOLUTION INTRAMUSCULAR; INTRAVENOUS at 02:01

## 2022-05-28 RX ADMIN — ORPHENADRINE CITRATE 60 MG: 30 INJECTION INTRAMUSCULAR; INTRAVENOUS at 00:09

## 2022-05-28 ASSESSMENT — ENCOUNTER SYMPTOMS
RHINORRHEA: 0
VOMITING: 0
SHORTNESS OF BREATH: 0
EYE REDNESS: 0
DIARRHEA: 0
CONSTIPATION: 0
PHOTOPHOBIA: 0
ABDOMINAL PAIN: 0
NAUSEA: 0
SORE THROAT: 0
SINUS PRESSURE: 0
COLOR CHANGE: 0
EYE PAIN: 0
CHEST TIGHTNESS: 0
COUGH: 0
BACK PAIN: 1

## 2022-05-28 NOTE — ED NOTES
Pt now stating her pain is causing her chest discomfort when she breathes in. Pt placed on SpO2 monitor, saturation 98%.  Dr. Alfonzo Tomlinson notified     Leonard Rust RN  05/28/22 [id number]

## 2022-05-28 NOTE — ED PROVIDER NOTES
[facility]  ED  Emergency Department Encounter  EmergencyMedicine Resident     Pt Name:Nelli Kapadia  MRN: [id number]  [place] 1950  Date of evaluation: 5/27/22  PCP:  MORRO Hamilton    This patient was evaluated in the Emergency Department for symptoms described in the history of present illness. The patient was evaluated in the context of the global COVID-19 pandemic, which necessitated consideration that the patient might be at risk for infection with the SARS-CoV-2 virus that causes COVID-19. Institutional protocols and algorithms that pertain to the evaluation of patients at risk for COVID-19 are in a state of rapid change based on information released by regulatory bodies including the CDC and federal and state organizations. These policies and algorithms were followed during the patient's care in the ED. CHIEF COMPLAINT       Chief Complaint   Patient presents with    Back Pain     thoracic       HISTORY OF PRESENT ILLNESS  (Location/Symptom, Timing/Onset, Context/Setting, Quality, Duration, Modifying Factors, Severity.)      Victorino Kang is a 80 y.o. female who presents with midthoracic back pain that began day ago after lifting heavy objects at home. States she did not feel a twinge or a pop this developed several hours after living heavy objects. Pain is midline thoracic around T4-T5, described as dull, as well as parathoracic muscular newness to palpation, no deformities noticed no step-offs, no overlying swelling or erythema. She did not fall. Her exam is completely unremarkable no numbness or tingling down her extremities, no loss of bowel or bladder. Daily steroid use, she does have a history of skin cancer, has a significant family history of cancer. Denies IV drug abuse.     PAST MEDICAL / SURGICAL / SOCIAL / FAMILY HISTORY      has a past medical history of Breast mass, right, H/O abnormal Pap smear, Lesion of skin of face, MVP (mitral valve prolapse), and Psoriasis. has a past surgical history that includes LEEP; Tonsillectomy; skin biopsy (Right, 3-25-15); and Breast surgery (Right, 3-25-15). Social History     Socioeconomic History    Marital status:      Spouse name: Not on file    Number of children: Not on file    Years of education: Not on file    Highest education level: Not on file   Occupational History    Not on file   Tobacco Use    Smoking status: Current Every Day Smoker     Packs/day: 1.00     Years: 40.00     Pack years: 40.00    Smokeless tobacco: Never Used    Tobacco comment: H/O 4 packs per day, down to 1 pack per day as of 3/11/2015   Vaping Use    Vaping Use: Never used   Substance and Sexual Activity    Alcohol use: Not on file     Comment: occasionally    Drug use: No    Sexual activity: Not on file   Other Topics Concern    Not on file   Social History Narrative    Not on file     Social Determinants of Health     Financial Resource Strain:     Difficulty of Paying Living Expenses: Not on file   Food Insecurity:     Worried About Running Out of Food in the Last Year: Not on file    Cresencio of Food in the Last Year: Not on file   Transportation Needs:     Lack of Transportation (Medical): Not on file    Lack of Transportation (Non-Medical):  Not on file   Physical Activity:     Days of Exercise per Week: Not on file    Minutes of Exercise per Session: Not on file   Stress:     Feeling of Stress : Not on file   Social Connections:     Frequency of Communication with Friends and Family: Not on file    Frequency of Social Gatherings with Friends and Family: Not on file    Attends Gnosticist Services: Not on file    Active Member of Clubs or Organizations: Not on file    Attends Club or Organization Meetings: Not on file    Marital Status: Not on file   Intimate Partner Violence:     Fear of Current or Ex-Partner: Not on file    Emotionally Abused: Not on file    Physically Abused: Not on file   Carolina Pederson Sexually Abused: Not on file   Housing Stability:     Unable to Pay for Housing in the Last Year: Not on file    Number of Places Lived in the Last Year: Not on file    Unstable Housing in the Last Year: Not on file       History reviewed. No pertinent family history. Allergies:  Codeine    Home Medications:  Prior to Admission medications    Medication Sig Start Date End Date Taking?  Authorizing Provider   lidocaine 4 % external patch Place 1 patch onto the skin daily for 10 days 5/28/22 6/7/22 Yes Deonte Quezada MD   cyclobenzaprine (FLEXERIL) 5 MG tablet Take 1 tablet by mouth 2 times daily as needed for Muscle spasms 5/28/22 6/7/22 Yes Deonte Quezada MD   naproxen (NAPROSYN) 500 MG tablet TAKE 1 TABLET BY MOUTH TWICE DAILY WITH MEALS 4/1/20   Memo Venegas MD   Emollient (CERAVE) CREA Apply topically nightly 4/9/19   Historical Provider, MD   albuterol sulfate  (90 Base) MCG/ACT inhaler INHALE 1 PUFF PO Q 4 H PRN 3/8/20   Historical Provider, MD   SYMBICORT 160-4.5 MCG/ACT AERO INL 2 PFS PO BID 3/5/20   Historical Provider, MD   benzonatate (TESSALON) 200 MG capsule TK 1 C PO TID PRN 1/6/20   Historical Provider, MD   CALCIUM 600+D3 600-800 MG-UNIT TABS TK 1 T PO BID 1/6/20   Historical Provider, MD   Cyanocobalamin (B-12) 1000 MCG TBCR TK 2 TS PO D 1/6/20   Historical Provider, MD   vitamin D (ERGOCALCIFEROL) 1.25 MG (26707 UT) CAPS capsule TK 1 C PO 1 TIME A WK 2/27/20   Historical Provider, MD   ipratropium-albuterol (DUONEB) 0.5-2.5 (3) MG/3ML SOLN nebulizer solution U 3 ML VIA NEB Q 6 H PRN 3/2/20   Historical Provider, MD   TRAVEL SICKNESS 25 MG CHEW CSW 1 T PO BID PRN 2/11/20   Historical Provider, MD   Naproxen Sodium 220 MG CAPS Take 220 mg by mouth 2 times daily (with meals)    Historical Provider, MD       REVIEW OF SYSTEMS    (2-9 systems for level 4, 10 or more for level 5)      Review of Systems   Constitutional: Negative for activity change, chills, diaphoresis, fatigue and fever.   HENT: Negative for congestion, rhinorrhea, sinus pressure and sore throat. Eyes: Negative for photophobia, pain and redness. Respiratory: Negative for cough, chest tightness and shortness of breath. Cardiovascular: Negative for chest pain and leg swelling. Gastrointestinal: Negative for abdominal pain, constipation, diarrhea, nausea and vomiting. Genitourinary: Negative for dysuria, flank pain, frequency and urgency. Musculoskeletal: Positive for back pain. Negative for arthralgias, gait problem, myalgias and neck stiffness. Skin: Negative for color change, pallor and rash. Neurological: Negative for dizziness, syncope, weakness, light-headedness, numbness and headaches. PHYSICAL EXAM   (up to 7 for level 4, 8 or more for level 5)      INITIAL VITALS:   /67   Pulse 89   Temp 97.7 °F (36.5 °C) (Oral)   Resp 18   Wt 123 lb (55.8 kg)   SpO2 98%   BMI 24.02 kg/m²     Physical Exam  Constitutional:  Well developed, Well nourished. HENT:  Normocephalic, Atraumatic, Bilateral external ears normal,  Nose normal.   Neck: Normal range of motion, No stridor. Eyes:   No discharge. Respiratory:   No respiratory distress, Normal breath sounds without any wheezing, rales or rhonchi. Cardiovascular: Normal S1, S2. No rubs, gallops or murmurs. Gastrointestinal:  No organomegaly, no tenderness, no rebound or guarding. Musculoskeletal:  No extremity deformity. Midline thoracic tenderness T5-T6 area, radiating to right paraspinal muscle, tenderness to palpation with no overlying swelling or erythema visualized  Lymphatic: No lymphadenopathy noted  Skin:  Warm, Dry,  No rash. Neurologic:  Alert & oriented x 3, Normal motor function,  No focal deficits noted.    Psychiatric:  Affect normal, Judgment normal, Mood normal.        DIFFERENTIAL  DIAGNOSIS     PLAN (LABS / IMAGING / EKG):  Orders Placed This Encounter   Procedures    CT THORACIC SPINE WO CONTRAST       MEDICATIONS ORDERED:  Orders Placed This Encounter   Medications    orphenadrine (NORFLEX) injection 60 mg    DISCONTD: lidocaine 4 % external patch 1 patch    DISCONTD: lidocaine 4 % external patch 1 patch    ketorolac (TORADOL) injection 15 mg    lidocaine 4 % external patch     Sig: Place 1 patch onto the skin daily for 10 days     Dispense:  10 patch     Refill:  0    cyclobenzaprine (FLEXERIL) 5 MG tablet     Sig: Take 1 tablet by mouth 2 times daily as needed for Muscle spasms     Dispense:  10 tablet     Refill:  0       DDX: Degenerative disc disease, musculoskeletal strain, metastatic lesions, pathologic fracture    DIAGNOSTIC RESULTS / EMERGENCY DEPARTMENT COURSE / MDM   LAB RESULTS:  No results found for this visit on 05/27/22. RADIOLOGY:  CT THORACIC SPINE WO CONTRAST   Final Result   No acute osseous abnormality is identified in the thoracic spine. Multilevel degenerative disc disease, though moderate to severe in the   midthoracic spine, greatest at T6-T7 through T9-T10. No lytic lesion or   osseous destructive process is identified. IMPRESSION: 78-year-old female history of skin cancer, complaining of midline thoracic pain radiating to right parathoracic muscular area with no neurological deficits will to ambulate without abnormality. Urine is musculoskeletal strain after lifting heavy objects, but also given her cancer history concern for metastatic disease to the spine or compression fracture. Obtain CT thoracic lumbar without contrast, pain management with IM Norflex, Lidocaine patch. EMERGENCY DEPARTMENT COURSE:  ED Course as of 05/28/22 0723   Sat May 28, 2022   0136  CT Thoracic  IMPRESSION:  No acute osseous abnormality is identified in the thoracic spine.     Multilevel degenerative disc disease, though moderate to severe in the  midthoracic spine, greatest at T6-T7 through T9-T10.   No lytic lesion or  osseous destructive process is identified.    [QC]   3113 Patient is neurovascular intact, observed to ambulate without gait normalities, gets around using a walker without issue. [QC]      ED Course User Index  [QC] Afshan Jaeger MD               PROCEDURES:      CONSULTS:  None        FINAL IMPRESSION      1.  Pain in thoracic spine          DISPOSITION / PLAN     DISPOSITION    Discharged    PATIENT REFERRED TO:  Francine Stovall APRN  [address] [facility] [address]  [place] [address]  [phone]    Schedule an appointment as soon as possible for a visit       [facility] ED  [address] [place] [address]  [phone]    If symptoms worsen      DISCHARGE MEDICATIONS:  Discharge Medication List as of 5/28/2022  1:55 AM      START taking these medications    Details   lidocaine 4 % external patch Place 1 patch onto the skin daily for 10 days, TransDERmal, DAILY Starting Sat 5/28/2022, Until Tue 6/7/2022, For 10 days, Disp-10 patch, R-0, Normal      cyclobenzaprine (FLEXERIL) 5 MG tablet Take 1 tablet by mouth 2 times daily as needed for Muscle spasms, Disp-10 tablet, R-0Normal             Afshan Jaeger MD  Emergency Medicine Resident    (Please note that portions of thisnote were completed with a voice recognition program.  Efforts were made to edit the dictations but occasionally words are mis-transcribed.)         Afshan Jaeger MD  Resident  05/28/22 [id number]

## 2022-05-28 NOTE — ED NOTES
Pt reports to the ED with complaints of back pain. Pt reports an increasing amount of heavy lifting lately, but denies any trauma or injury to the area. Pt does not have any other complaints at this time. Pt is A&O x4 and speaking in complete sentences. Pt is resting in bed comfortably, NAD noted. Pt denies chest pain, SOB, N/V/D, problems urinating, loss of appetite.  Will continue to monitor     Dov De León RN  05/27/22 [id number]

## 2022-05-28 NOTE — ED PROVIDER NOTES
[facility] ED     Emergency Department     Faculty Attestation        I performed a history and physical examination of the patient and discussed management with the resident. I reviewed the residents note and agree with the documented findings and plan of care. Any areas of disagreement are noted on the chart. I was personally present for the key portions of any procedures. I have documented in the chart those procedures where I was not present during the key portions. I have reviewed the emergency nurses triage note. I agree with the chief complaint, past medical history, past surgical history, allergies, medications, social and family history as documented unless otherwise noted below. For mid-level providers such as nurse practitioners as well as physicians assistants:    I have personally seen and evaluated the patient. I find the patient's history and physical exam are consistent with NP/PA documentation. I agree with the care provided, treatment rendered, disposition, & follow-up plan. Additional findings are as noted. Vital Signs: /67   Pulse 89   Temp 97.7 °F (36.5 °C) (Oral)   Resp 18   Wt 123 lb (55.8 kg)   SpO2 97%   BMI 24.02 kg/m²   PCP:  MORRO Paulino    Pertinent Comments:     Patient has some midline thoracic back tenderness after moving some objects. She had a CT of her chest about a year ago which showed normal caliber great vessels and no evidence of aneurysms. She does have some mild midline tenderness but has no chest pain or anginal symptoms.   She is resting comfortably no acute distress given her age will obtain CT imaging of the thoracic spine pain control, reassessment      Critical Care  None          Jaya Simmons MD    Attending Emergency Medicine Physician              Carin Machado MD  05/27/22 5213

## 2022-05-28 NOTE — ED NOTES
SW attempted to arrange transport using patient's insurance [employer], but pt does not have transportation assistance benefits per worker. SW used voucher with [employer].  Per Dr. Luke Forbes pt can use cab transportation.   Trip # [id number]     CANDELARIA Baires  05/28/22 5617

## 2022-06-29 ENCOUNTER — TELEPHONE (OUTPATIENT)
Dept: ORTHOPEDIC SURGERY | Age: 72
End: 2022-06-29